# Patient Record
Sex: FEMALE | Race: WHITE | NOT HISPANIC OR LATINO | Employment: FULL TIME | URBAN - METROPOLITAN AREA
[De-identification: names, ages, dates, MRNs, and addresses within clinical notes are randomized per-mention and may not be internally consistent; named-entity substitution may affect disease eponyms.]

---

## 2019-09-23 ENCOUNTER — OCCUPATIONAL HEALTH (OUTPATIENT)
Dept: URGENT CARE | Facility: CLINIC | Age: 54
End: 2019-09-23

## 2019-09-23 DIAGNOSIS — Z02.89 ENCOUNTER FOR PHYSICAL EXAMINATION RELATED TO EMPLOYMENT: ICD-10-CM

## 2019-09-23 PROCEDURE — 99499 UNLISTED E&M SERVICE: CPT | Mod: S$GLB,,, | Performed by: EMERGENCY MEDICINE

## 2019-09-23 PROCEDURE — 99499 PR PHYSICAL - DOT/CDL: ICD-10-PCS | Mod: S$GLB,,, | Performed by: EMERGENCY MEDICINE

## 2020-09-18 ENCOUNTER — OCCUPATIONAL HEALTH (OUTPATIENT)
Dept: URGENT CARE | Facility: CLINIC | Age: 55
End: 2020-09-18

## 2020-09-18 PROCEDURE — 99499 PR PHYSICAL - DOT/CDL: ICD-10-PCS | Mod: S$GLB,,, | Performed by: EMERGENCY MEDICINE

## 2020-09-18 PROCEDURE — 99499 UNLISTED E&M SERVICE: CPT | Mod: S$GLB,,, | Performed by: EMERGENCY MEDICINE

## 2021-09-17 ENCOUNTER — OCCUPATIONAL HEALTH (OUTPATIENT)
Dept: URGENT CARE | Facility: CLINIC | Age: 56
End: 2021-09-17

## 2021-09-17 PROCEDURE — 99499 UNLISTED E&M SERVICE: CPT | Mod: S$GLB,,, | Performed by: EMERGENCY MEDICINE

## 2021-09-17 PROCEDURE — 99499 PR PHYSICAL - DOT/CDL: ICD-10-PCS | Mod: S$GLB,,, | Performed by: EMERGENCY MEDICINE

## 2021-10-12 LAB — HCV AB SERPL QL IA: NEGATIVE

## 2021-10-21 LAB — HIV 1+2 AB+HIV1 P24 AG SERPL QL IA: NEGATIVE

## 2022-02-09 ENCOUNTER — OFFICE VISIT (OUTPATIENT)
Dept: FAMILY MEDICINE | Facility: CLINIC | Age: 57
End: 2022-02-09
Payer: COMMERCIAL

## 2022-02-09 VITALS
SYSTOLIC BLOOD PRESSURE: 123 MMHG | HEIGHT: 62 IN | BODY MASS INDEX: 26.13 KG/M2 | HEART RATE: 77 BPM | DIASTOLIC BLOOD PRESSURE: 61 MMHG | OXYGEN SATURATION: 100 % | TEMPERATURE: 97 F | WEIGHT: 142 LBS

## 2022-02-09 DIAGNOSIS — Z11.4 ENCOUNTER FOR SCREENING FOR HIV: ICD-10-CM

## 2022-02-09 DIAGNOSIS — Z80.0 FHX: PANCREATIC CANCER: ICD-10-CM

## 2022-02-09 DIAGNOSIS — N95.1 MENOPAUSAL SYNDROME (HOT FLASHES): ICD-10-CM

## 2022-02-09 DIAGNOSIS — Z11.59 ENCOUNTER FOR HEPATITIS C SCREENING TEST FOR LOW RISK PATIENT: ICD-10-CM

## 2022-02-09 DIAGNOSIS — Z00.00 ANNUAL PHYSICAL EXAM: ICD-10-CM

## 2022-02-09 DIAGNOSIS — Z12.31 ENCOUNTER FOR SCREENING MAMMOGRAM FOR MALIGNANT NEOPLASM OF BREAST: Primary | ICD-10-CM

## 2022-02-09 PROBLEM — F41.9 ANXIETY: Status: ACTIVE | Noted: 2022-02-09

## 2022-02-09 PROCEDURE — 3078F PR MOST RECENT DIASTOLIC BLOOD PRESSURE < 80 MM HG: ICD-10-PCS | Mod: CPTII,S$GLB,, | Performed by: NURSE PRACTITIONER

## 2022-02-09 PROCEDURE — 99386 PREV VISIT NEW AGE 40-64: CPT | Mod: S$GLB,,, | Performed by: NURSE PRACTITIONER

## 2022-02-09 PROCEDURE — 1160F RVW MEDS BY RX/DR IN RCRD: CPT | Mod: CPTII,S$GLB,, | Performed by: NURSE PRACTITIONER

## 2022-02-09 PROCEDURE — 3008F PR BODY MASS INDEX (BMI) DOCUMENTED: ICD-10-PCS | Mod: CPTII,S$GLB,, | Performed by: NURSE PRACTITIONER

## 2022-02-09 PROCEDURE — 3008F BODY MASS INDEX DOCD: CPT | Mod: CPTII,S$GLB,, | Performed by: NURSE PRACTITIONER

## 2022-02-09 PROCEDURE — 99386 PR PREVENTIVE VISIT,NEW,40-64: ICD-10-PCS | Mod: S$GLB,,, | Performed by: NURSE PRACTITIONER

## 2022-02-09 PROCEDURE — 3074F SYST BP LT 130 MM HG: CPT | Mod: CPTII,S$GLB,, | Performed by: NURSE PRACTITIONER

## 2022-02-09 PROCEDURE — 1159F MED LIST DOCD IN RCRD: CPT | Mod: CPTII,S$GLB,, | Performed by: NURSE PRACTITIONER

## 2022-02-09 PROCEDURE — 3078F DIAST BP <80 MM HG: CPT | Mod: CPTII,S$GLB,, | Performed by: NURSE PRACTITIONER

## 2022-02-09 PROCEDURE — 3074F PR MOST RECENT SYSTOLIC BLOOD PRESSURE < 130 MM HG: ICD-10-PCS | Mod: CPTII,S$GLB,, | Performed by: NURSE PRACTITIONER

## 2022-02-09 PROCEDURE — 1160F PR REVIEW ALL MEDS BY PRESCRIBER/CLIN PHARMACIST DOCUMENTED: ICD-10-PCS | Mod: CPTII,S$GLB,, | Performed by: NURSE PRACTITIONER

## 2022-02-09 PROCEDURE — 1159F PR MEDICATION LIST DOCUMENTED IN MEDICAL RECORD: ICD-10-PCS | Mod: CPTII,S$GLB,, | Performed by: NURSE PRACTITIONER

## 2022-02-09 NOTE — PROGRESS NOTES
SUBJECTIVE:      Patient ID: Shy Cuadra is a 56 y.o. female.    Chief Complaint: Establish Care    HPI  Patient is new to us but not to [facility]  Vss; bp is well controlled  Denies chest pain  Denies sob  Denies fever  Denies chill    [facility]  Is here to establish and discuss hair loss  Has been having more hair loss than usual    Any known family hx of colon cancer: none known  Smoking: no  Alcohol: glass a wine 4 times per week  Career:   Dad: dead; pancreatic cancer; and so did his mother  Mom: alive; healthy  Siblings: two sisters two brothers - alive and healthy  Children: one- 22 years old boy- alive and healthy  Surgical hx: tonsillectomy- no adverse reaction to anesthesia; dr latha brush- removal of ovarian cyst   Medical hx: hotflashes on lexapro is nonsuicidal; is having hair loss (hormonal labs and tsh drawn by dr siria dugan- normal tsh; not in menopause just yet)    Past Surgical History:   Procedure Laterality Date    CYST REMOVAL      left breast    laparoscopy for endometriosis      TONSILLECTOMY  01/2013     Family History   Problem Relation Age of Onset    Heart disease Father     Heart attack Father         at age 55    Hypertension Father     Osteoporosis Maternal Grandmother       Social History     Socioeconomic History    Marital status:      Spouse name: Ronn    Number of children: 1   Tobacco Use    Smoking status: Former Smoker    Smokeless tobacco: Never Used   Substance and Sexual Activity    Alcohol use: Yes     Comment: wine-occasionally    Sexual activity: Yes     Partners: Male     Current Outpatient Medications   Medication Sig Dispense Refill    escitalopram oxalate (LEXAPRO) 10 MG tablet Take 10 mg by mouth once daily.  0     No current facility-administered medications for this visit.     Review of patient's allergies indicates:   Allergen Reactions    Latex, natural rubber Rash     "  Past Medical History:   Diagnosis Date    Anxiety     Back pain     Eczema     Foot pain     History of chicken pox     Rash     Skin lesion      Past Surgical History:   Procedure Laterality Date    CYST REMOVAL      left breast    laparoscopy for endometriosis      TONSILLECTOMY  01/2013       Review of Systems   All other systems reviewed and are negative.     OBJECTIVE:      Vitals:    02/09/22 1007   BP: 123/61   Pulse: 77   Temp: 97.4 °F (36.3 °C)   SpO2: 100%   Weight: 64.4 kg (142 lb)   Height: 5' 2" (1.575 m)     Physical Exam  Vitals and nursing note reviewed.   Constitutional:       Appearance: Normal appearance. She is normal weight.   HENT:      Head: Normocephalic and atraumatic.   Eyes:      Pupils: Pupils are equal, round, and reactive to light.   Neck:      Comments: No thrills no bruits  No abnormal neck masses felt  Cardiovascular:      Rate and Rhythm: Normal rate and regular rhythm.      Pulses: Normal pulses.      Heart sounds: Normal heart sounds.      Comments: s1 s2 nsr  No edema noted on bilateral lower ext  Pulmonary:      Effort: Pulmonary effort is normal.      Breath sounds: Normal breath sounds.   Musculoskeletal:      Cervical back: Normal range of motion.   Skin:     General: Skin is warm.   Neurological:      General: No focal deficit present.      Mental Status: She is alert and oriented to person, place, and time. Mental status is at baseline.   Psychiatric:         Mood and Affect: Mood normal.         Behavior: Behavior normal.         Thought Content: Thought content normal.         Judgment: Judgment normal.      Comments: nonsuicidal        Assessment:       1. Encounter for screening mammogram for malignant neoplasm of breast    2. Encounter for screening for HIV    3. Encounter for hepatitis C screening test for low risk patient    4. Annual physical exam    5. FHx: pancreatic cancer    6. Menopausal syndrome (hot flashes)        Plan:       Encounter for " screening mammogram for malignant neoplasm of breast  -     Mammo Digital Screening Bilat; Future; Expected date: 02/09/2022    Encounter for screening for HIV    Encounter for hepatitis C screening test for low risk patient    Annual physical exam  -     Comprehensive Metabolic Panel; Future; Expected date: 02/09/2022  -     CBC Auto Differential; Future; Expected date: 02/09/2022  -     Lipid Panel; Future; Expected date: 02/09/2022    FHx: pancreatic cancer    Menopausal syndrome (hot flashes)    [facility] to [facility] or [facility] and get on b complex  2000 vit d 3 and 1200 of calcium daily  Vital Proteins collagen powder  Get fasting labs done  Follow up as needed  Get mamogram done    No follow-ups on file.      2/9/2022 AN Perez, FNP

## 2022-09-08 ENCOUNTER — OCCUPATIONAL HEALTH (OUTPATIENT)
Dept: URGENT CARE | Facility: CLINIC | Age: 57
End: 2022-09-08
Payer: COMMERCIAL

## 2022-09-08 DIAGNOSIS — Z00.00 ENCOUNTER FOR PHYSICAL EXAMINATION: Primary | ICD-10-CM

## 2022-09-08 LAB — COLLECTION ONLY: NORMAL

## 2022-09-08 PROCEDURE — 99499 UNLISTED E&M SERVICE: CPT | Mod: S$GLB,,, | Performed by: NURSE PRACTITIONER

## 2022-09-08 PROCEDURE — 99499 DOT PHYSICAL: ICD-10-PCS | Mod: S$GLB,,, | Performed by: NURSE PRACTITIONER

## 2022-09-28 ENCOUNTER — HOSPITAL ENCOUNTER (OUTPATIENT)
Dept: PREADMISSION TESTING | Facility: HOSPITAL | Age: 57
Discharge: HOME | End: 2022-09-28
Attending: SPECIALIST
Payer: COMMERCIAL

## 2022-09-28 ENCOUNTER — LAB VISIT (OUTPATIENT)
Dept: LAB | Facility: HOSPITAL | Age: 57
End: 2022-09-28
Attending: SPECIALIST
Payer: COMMERCIAL

## 2022-09-28 VITALS
TEMPERATURE: 97 F | WEIGHT: 141.13 LBS | HEART RATE: 82 BPM | SYSTOLIC BLOOD PRESSURE: 130 MMHG | OXYGEN SATURATION: 98 % | DIASTOLIC BLOOD PRESSURE: 74 MMHG | BODY MASS INDEX: 25.01 KG/M2 | HEIGHT: 63 IN | RESPIRATION RATE: 16 BRPM

## 2022-09-28 DIAGNOSIS — Z01.818 OTHER SPECIFIED PRE-OPERATIVE EXAMINATION: Primary | ICD-10-CM

## 2022-09-28 DIAGNOSIS — Z01.818 PRE-OP TESTING: ICD-10-CM

## 2022-09-28 DIAGNOSIS — Z01.818 PRE-OP TESTING: Primary | ICD-10-CM

## 2022-09-28 LAB
ABO + RH BLD: NORMAL
B-HCG UR QL: NEGATIVE
BLD GP AB SCN CELLS X3 SERPL QL: NORMAL
ERYTHROCYTE [DISTWIDTH] IN BLOOD BY AUTOMATED COUNT: 13.5 % (ref 11.5–14.5)
HCT VFR BLD AUTO: 38.5 % (ref 37–48.5)
HGB BLD-MCNC: 12.5 G/DL (ref 12–16)
MCH RBC QN AUTO: 30.1 PG (ref 27–31)
MCHC RBC AUTO-ENTMCNC: 32.5 G/DL (ref 32–36)
MCV RBC AUTO: 93 FL (ref 82–98)
PLATELET # BLD AUTO: 227 K/UL (ref 150–450)
PMV BLD AUTO: 9.8 FL (ref 9.2–12.9)
RBC # BLD AUTO: 4.15 M/UL (ref 4–5.4)
WBC # BLD AUTO: 6.62 K/UL (ref 3.9–12.7)

## 2022-09-28 PROCEDURE — 86901 BLOOD TYPING SEROLOGIC RH(D): CPT | Performed by: SPECIALIST

## 2022-09-28 PROCEDURE — 81025 URINE PREGNANCY TEST: CPT | Performed by: SPECIALIST

## 2022-09-28 PROCEDURE — 93010 EKG 12-LEAD: ICD-10-PCS | Mod: ,,, | Performed by: INTERNAL MEDICINE

## 2022-09-28 PROCEDURE — 93010 ELECTROCARDIOGRAM REPORT: CPT | Mod: ,,, | Performed by: INTERNAL MEDICINE

## 2022-09-28 PROCEDURE — 36415 COLL VENOUS BLD VENIPUNCTURE: CPT | Performed by: SPECIALIST

## 2022-09-28 PROCEDURE — 93005 ELECTROCARDIOGRAM TRACING: CPT | Performed by: INTERNAL MEDICINE

## 2022-09-28 PROCEDURE — 85027 COMPLETE CBC AUTOMATED: CPT | Performed by: SPECIALIST

## 2022-09-30 ENCOUNTER — ANESTHESIA (OUTPATIENT)
Dept: SURGERY | Facility: HOSPITAL | Age: 57
End: 2022-09-30
Payer: COMMERCIAL

## 2022-09-30 ENCOUNTER — ANESTHESIA EVENT (OUTPATIENT)
Dept: SURGERY | Facility: HOSPITAL | Age: 57
End: 2022-09-30
Payer: COMMERCIAL

## 2022-09-30 ENCOUNTER — HOSPITAL ENCOUNTER (OUTPATIENT)
Facility: HOSPITAL | Age: 57
Discharge: HOME | End: 2022-09-30
Attending: SPECIALIST | Admitting: SPECIALIST
Payer: COMMERCIAL

## 2022-09-30 VITALS
DIASTOLIC BLOOD PRESSURE: 68 MMHG | TEMPERATURE: 98 F | SYSTOLIC BLOOD PRESSURE: 137 MMHG | RESPIRATION RATE: 18 BRPM | HEART RATE: 64 BPM | OXYGEN SATURATION: 97 %

## 2022-09-30 DIAGNOSIS — Z01.818 PRE-OP TESTING: ICD-10-CM

## 2022-09-30 DIAGNOSIS — N93.9 ABNORMAL UTERINE BLEEDING: Primary | ICD-10-CM

## 2022-09-30 PROCEDURE — 71000033 HC RECOVERY, INTIAL HOUR: Performed by: SPECIALIST

## 2022-09-30 PROCEDURE — 27000671 HC TUBING MICROBORE EXT: Performed by: ANESTHESIOLOGY

## 2022-09-30 PROCEDURE — 25000003 PHARM REV CODE 250: Performed by: NURSE ANESTHETIST, CERTIFIED REGISTERED

## 2022-09-30 PROCEDURE — 25000003 PHARM REV CODE 250: Performed by: SPECIALIST

## 2022-09-30 PROCEDURE — 27000080 OPTIME MED/SURG SUP & DEVICES GENERAL CLASSIFICATION: Performed by: SPECIALIST

## 2022-09-30 PROCEDURE — 27202107 HC XP QUATRO SENSOR: Performed by: ANESTHESIOLOGY

## 2022-09-30 PROCEDURE — 25000003 PHARM REV CODE 250: Performed by: ANESTHESIOLOGY

## 2022-09-30 PROCEDURE — 71000015 HC POSTOP RECOV 1ST HR: Performed by: SPECIALIST

## 2022-09-30 PROCEDURE — 27201107 HC STYLET, STANDARD: Performed by: ANESTHESIOLOGY

## 2022-09-30 PROCEDURE — 63600175 PHARM REV CODE 636 W HCPCS: Performed by: NURSE ANESTHETIST, CERTIFIED REGISTERED

## 2022-09-30 PROCEDURE — 27201423 OPTIME MED/SURG SUP & DEVICES STERILE SUPPLY: Performed by: SPECIALIST

## 2022-09-30 PROCEDURE — 36000707: Performed by: SPECIALIST

## 2022-09-30 PROCEDURE — 27000673 HC TUBING BLOOD Y: Performed by: ANESTHESIOLOGY

## 2022-09-30 PROCEDURE — 37000008 HC ANESTHESIA 1ST 15 MINUTES: Performed by: SPECIALIST

## 2022-09-30 PROCEDURE — 36000706: Performed by: SPECIALIST

## 2022-09-30 PROCEDURE — 37000009 HC ANESTHESIA EA ADD 15 MINS: Performed by: SPECIALIST

## 2022-09-30 RX ORDER — ONDANSETRON HYDROCHLORIDE 2 MG/ML
INJECTION, SOLUTION INTRAMUSCULAR; INTRAVENOUS
Status: DISCONTINUED | OUTPATIENT
Start: 2022-09-30 | End: 2022-09-30

## 2022-09-30 RX ORDER — ROCURONIUM BROMIDE 10 MG/ML
INJECTION, SOLUTION INTRAVENOUS
Status: DISCONTINUED | OUTPATIENT
Start: 2022-09-30 | End: 2022-09-30

## 2022-09-30 RX ORDER — SUCCINYLCHOLINE CHLORIDE 20 MG/ML
INJECTION INTRAMUSCULAR; INTRAVENOUS
Status: DISCONTINUED | OUTPATIENT
Start: 2022-09-30 | End: 2022-09-30

## 2022-09-30 RX ORDER — ONDANSETRON 2 MG/ML
4 INJECTION INTRAMUSCULAR; INTRAVENOUS DAILY PRN
Status: DISCONTINUED | OUTPATIENT
Start: 2022-09-30 | End: 2022-09-30 | Stop reason: HOSPADM

## 2022-09-30 RX ORDER — FAMOTIDINE 10 MG/ML
INJECTION INTRAVENOUS
Status: DISCONTINUED | OUTPATIENT
Start: 2022-09-30 | End: 2022-09-30

## 2022-09-30 RX ORDER — OXYCODONE HYDROCHLORIDE 5 MG/1
5 TABLET ORAL
Status: COMPLETED | OUTPATIENT
Start: 2022-09-30 | End: 2022-09-30

## 2022-09-30 RX ORDER — MEPERIDINE HYDROCHLORIDE 50 MG/ML
12.5 INJECTION INTRAMUSCULAR; INTRAVENOUS; SUBCUTANEOUS EVERY 10 MIN PRN
Status: DISCONTINUED | OUTPATIENT
Start: 2022-09-30 | End: 2022-09-30 | Stop reason: HOSPADM

## 2022-09-30 RX ORDER — PHENYLEPHRINE HYDROCHLORIDE 10 MG/ML
INJECTION INTRAVENOUS
Status: DISCONTINUED | OUTPATIENT
Start: 2022-09-30 | End: 2022-09-30

## 2022-09-30 RX ORDER — FENTANYL CITRATE 50 UG/ML
INJECTION, SOLUTION INTRAMUSCULAR; INTRAVENOUS
Status: DISCONTINUED | OUTPATIENT
Start: 2022-09-30 | End: 2022-09-30

## 2022-09-30 RX ORDER — HYDROMORPHONE HYDROCHLORIDE 1 MG/ML
0.2 INJECTION, SOLUTION INTRAMUSCULAR; INTRAVENOUS; SUBCUTANEOUS EVERY 5 MIN PRN
Status: DISCONTINUED | OUTPATIENT
Start: 2022-09-30 | End: 2022-09-30 | Stop reason: HOSPADM

## 2022-09-30 RX ORDER — SODIUM CHLORIDE 0.9 G/100ML
IRRIGANT IRRIGATION
Status: DISCONTINUED | OUTPATIENT
Start: 2022-09-30 | End: 2022-09-30 | Stop reason: HOSPADM

## 2022-09-30 RX ORDER — DEXAMETHASONE SODIUM PHOSPHATE 4 MG/ML
INJECTION, SOLUTION INTRA-ARTICULAR; INTRALESIONAL; INTRAMUSCULAR; INTRAVENOUS; SOFT TISSUE
Status: DISCONTINUED | OUTPATIENT
Start: 2022-09-30 | End: 2022-09-30

## 2022-09-30 RX ORDER — OXYCODONE AND ACETAMINOPHEN 5; 325 MG/1; MG/1
1 TABLET ORAL EVERY 6 HOURS PRN
Qty: 6 TABLET | Refills: 0
Start: 2022-09-30 | End: 2022-11-16 | Stop reason: ALTCHOICE

## 2022-09-30 RX ORDER — ACETAMINOPHEN 10 MG/ML
INJECTION, SOLUTION INTRAVENOUS
Status: DISCONTINUED | OUTPATIENT
Start: 2022-09-30 | End: 2022-09-30

## 2022-09-30 RX ORDER — PROPOFOL 10 MG/ML
VIAL (ML) INTRAVENOUS
Status: DISCONTINUED | OUTPATIENT
Start: 2022-09-30 | End: 2022-09-30

## 2022-09-30 RX ADMIN — ROCURONIUM BROMIDE 10 MG: 10 INJECTION, SOLUTION INTRAVENOUS at 07:09

## 2022-09-30 RX ADMIN — OXYCODONE HYDROCHLORIDE 5 MG: 5 TABLET ORAL at 08:09

## 2022-09-30 RX ADMIN — ACETAMINOPHEN 1000 MG: 10 INJECTION, SOLUTION INTRAVENOUS at 07:09

## 2022-09-30 RX ADMIN — PHENYLEPHRINE HYDROCHLORIDE 200 MCG: 10 INJECTION INTRAVENOUS at 08:09

## 2022-09-30 RX ADMIN — ROCURONIUM BROMIDE 20 MG: 10 INJECTION, SOLUTION INTRAVENOUS at 07:09

## 2022-09-30 RX ADMIN — SUGAMMADEX 200 MG: 100 INJECTION, SOLUTION INTRAVENOUS at 08:09

## 2022-09-30 RX ADMIN — PHENYLEPHRINE HYDROCHLORIDE 200 MCG: 10 INJECTION INTRAVENOUS at 07:09

## 2022-09-30 RX ADMIN — SUCCINYLCHOLINE CHLORIDE 140 MG: 20 INJECTION, SOLUTION INTRAMUSCULAR; INTRAVENOUS at 07:09

## 2022-09-30 RX ADMIN — FAMOTIDINE 20 MG: 10 INJECTION, SOLUTION INTRAVENOUS at 07:09

## 2022-09-30 RX ADMIN — DEXAMETHASONE SODIUM PHOSPHATE 8 MG: 4 INJECTION, SOLUTION INTRAMUSCULAR; INTRAVENOUS at 07:09

## 2022-09-30 RX ADMIN — FENTANYL CITRATE 100 MCG: 50 INJECTION, SOLUTION INTRAMUSCULAR; INTRAVENOUS at 07:09

## 2022-09-30 RX ADMIN — ONDANSETRON 4 MG: 2 INJECTION, SOLUTION INTRAMUSCULAR; INTRAVENOUS at 07:09

## 2022-09-30 RX ADMIN — SODIUM CHLORIDE, SODIUM LACTATE, POTASSIUM CHLORIDE, AND CALCIUM CHLORIDE: .6; .31; .03; .02 INJECTION, SOLUTION INTRAVENOUS at 07:09

## 2022-09-30 RX ADMIN — PROPOFOL 150 MG: 10 INJECTION, EMULSION INTRAVENOUS at 07:09

## 2022-09-30 NOTE — ANESTHESIA PREPROCEDURE EVALUATION
09/30/2022  Shy Cuadra is a 57 y.o., female.      Pre-op Assessment    I have reviewed the Patient Summary Reports.     I have reviewed the Nursing Notes. I have reviewed the NPO Status.   I have reviewed the Medications.     Review of Systems  Anesthesia Hx:  No problems with previous Anesthesia Denies Hx of Anesthetic complications  Neg history of prior surgery. Denies Family Hx of Anesthesia complications.   Denies Personal Hx of Anesthesia complications.   Social:  Non-Smoker, Alcohol Use    Hematology/Oncology:  Hematology Normal   Oncology Normal     EENT/Dental:   TMJ, has locked closed in past   Cardiovascular:  Cardiovascular Normal     Pulmonary:  Pulmonary Normal    Renal/:  Renal/ Normal     Hepatic/GI:  Hepatic/GI Normal    Musculoskeletal:  Spine Disorders: lumbar Degenerative disease    Neurological:  Neurology Normal    Endocrine:  Endocrine Normal    Dermatological:  Skin Normal    Psych:   anxiety          Patient Active Problem List   Diagnosis    Anxiety    FHx: pancreatic cancer    Menopausal syndrome (hot flashes)       Past Surgical History:   Procedure Laterality Date    BREAST SURGERY      CYST REMOVAL      left breast    laparoscopy for endometriosis      TONSILLECTOMY  01/01/2013        Tobacco Use:  The patient  reports that she has quit smoking. She has never used smokeless tobacco.     Results for orders placed or performed during the hospital encounter of 09/28/22   EKG 12-lead    Collection Time: 09/28/22 10:46 AM    Narrative    Test Reason : Z01.818,Z01.818,    Vent. Rate : 072 BPM     Atrial Rate : 072 BPM     P-R Int : 144 ms          QRS Dur : 082 ms      QT Int : 390 ms       P-R-T Axes : 062 026 053 degrees     QTc Int : 427 ms    Normal sinus rhythm  Nonspecific ST abnormality  Abnormal ECG  No previous ECGs available    Referred By: MELLISSA FERNÁNDEZ            Confirmed By:              Lab Results   Component Value Date    WBC 6.62 09/28/2022    HGB 12.5 09/28/2022    HCT 38.5 09/28/2022    MCV 93 09/28/2022     09/28/2022     BMP  Lab Results   Component Value Date     08/07/2015    K 4.7 08/07/2015     08/07/2015    CO2 27 08/07/2015    BUN 19 (H) 08/07/2015    CREATININE 0.80 08/07/2015    CALCIUM 9.9 08/07/2015    ANIONGAP 10 08/07/2015    GLU 87 08/07/2015 09/28/22 11:37   Preg Test, Ur Negative     No results found for this or any previous visit.          Physical Exam  General: Well nourished    Airway:  Mallampati: II   Mouth Opening: Normal  TM Distance: Normal  Tongue: Normal  Neck ROM: Normal ROM    Dental:  Intact    Chest/Lungs:  Clear to auscultation, Normal Respiratory Rate    Heart:  Rate: Normal  Rhythm: Regular Rhythm  Sounds: Normal        Anesthesia Plan  Type of Anesthesia, risks & benefits discussed:    Anesthesia Type: Gen Supraglottic Airway  Intra-op Monitoring Plan: Standard ASA Monitors  Post Op Pain Control Plan: multimodal analgesia  Induction:  IV  Informed Consent: Informed consent signed with the Patient and all parties understand the risks and agree with anesthesia plan.  All questions answered.   ASA Score: 2  Anesthesia Plan Notes: LMA OK  Tylenol 1 gm IV  Zofran, Pepcid, Decadron    Ready For Surgery From Anesthesia Perspective.     .

## 2022-09-30 NOTE — ANESTHESIA POSTPROCEDURE EVALUATION
Anesthesia Post Evaluation    Patient: Shy Cuadra    Procedure(s) Performed: Procedure(s) (LRB):  ABLATION, ENDOMETRIUM (N/A)  HYSTEROSCOPY, WITH DILATION AND CURETTAGE OF UTERUS (N/A)  MYOMECTOMY, HYSTEROSCOPIC    Final Anesthesia Type: general      Patient location during evaluation: PACU  Patient participation: Yes- Able to Participate  Level of consciousness: awake and alert  Post-procedure vital signs: reviewed and stable  Pain management: adequate  Airway patency: patent    PONV status at discharge: No PONV  Anesthetic complications: no      Cardiovascular status: stable  Respiratory status: unassisted and spontaneous ventilation  Hydration status: euvolemic  Follow-up not needed.          Vitals Value Taken Time   /62 09/30/22 0900   Temp 36.1 °C (97 °F) 09/30/22 0830   Pulse 87 09/30/22 0912   Resp 20 09/30/22 0910   SpO2 97 % 09/30/22 0912   Vitals shown include unvalidated device data.      Event Time   Out of Recovery 09:10:00         Pain/Luna Score: Pain Rating Prior to Med Admin: 3 (9/30/2022  8:48 AM)  Luna Score: 10 (9/30/2022  9:00 AM)

## 2022-09-30 NOTE — TRANSFER OF CARE
Anesthesia Transfer of Care Note    Patient: Shy Cuadra    Procedure(s) Performed: Procedure(s) (LRB):  ABLATION, ENDOMETRIUM (N/A)  HYSTEROSCOPY, WITH DILATION AND CURETTAGE OF UTERUS WITH MYOSURE (N/A)    Patient location: PACU    Anesthesia Type: general    Transport from OR: Transported from OR on room air with adequate spontaneous ventilation    Post pain: adequate analgesia    Post assessment: no apparent anesthetic complications    Post vital signs: stable    Level of consciousness: awake, alert and oriented    Nausea/Vomiting: no nausea/vomiting    Complications: none    Transfer of care protocol was followed      Last vitals:   Visit Vitals  /69   Pulse 88   Temp 36.7 °C (98.1 °F) (Oral)   Resp 14   LMP 08/03/2022   SpO2 97%   Breastfeeding No

## 2022-09-30 NOTE — OP NOTE
Preop Diagnosis is abnormal uterine bleeding    Postop diagnosis same    Procedure hysteroscopy MyoSure NovaSure endometrial ablation    Surgeon Shorty    Estimated blood loss minimal    Anesthesia general    Findings polypoid appearing tissue removed with MyoSure  Cavity length 5 cm cavity width 3.6 cm power 99 w duration of 1 minute    Procedure in detail  After appropriate informed consent the above findings noted she was taken to the operating room placed in a dorsal lithotomy position with legs in Enoch stirrups.  She was prepped and draped in usual sterile fashion vaginally the straight catheter was used to drain the bladder.  The speculum was inserted where and the cervix was grasped with a tenaculum and dilated to accommodate the MyoSure scope.  The measurements of the cavity were obtained with a finding of 5 cm of cavity length.  The scope was inserted and with saline flowing were able to visualize the cavity showing some polypoid type tissue throughout the cavity.  We then introduced the MyoSure device under direct visualization removing the polypoid tissue creating a clear cavity prior to the NovaSure.  At this point the instruments room swapped and the NovaSure was inserted set into place using the appropriate maneuvers.  The cavity width was 3.6 cm.  The cavity integrity test was performed and we passed.  So then the ablation started and went on for approximately a minute at 99 w.  Afterwards the NovaSure was removed and the hysteroscope was were replaced back in the uterine cavity and with saline flowing we visualized a nicely charred uterine cavity with no normal viable tissue noted.  All the instruments instruments removed patient tolerated procedure well sent recovery room in stable condition.

## 2022-09-30 NOTE — ADDENDUM NOTE
Addendum  created 09/30/22 1133 by Tima Nicole, CRNA    Flowsheet accepted, Intraprocedure Meds edited

## 2022-09-30 NOTE — ANESTHESIA PROCEDURE NOTES
Intubation    Date/Time: 9/30/2022 7:37 AM  Performed by: Tima Nicole CRNA  Authorized by: Aayush Frost MD     Intubation:     Induction:  Intravenous    Intubated:  Postinduction    Mask Ventilation:  N/a    Attempts:  1    Attempted By:  CRNA    Method of Intubation:  Video laryngoscopy    Blade:  Shannon 3    Laryngeal View Grade: Grade I - full view of cords      Difficult Airway Encountered?: No      Complications:  None    Airway Device:  Oral endotracheal tube    Airway Device Size:  7.5    Style/Cuff Inflation:  Cuffed    Tube secured:  22    Secured at:  The lips    Placement Verified By:  Capnometry    Complicating Factors:  None    Findings Post-Intubation:  BS equal bilateral and atraumatic/condition of teeth unchanged

## 2022-10-20 DIAGNOSIS — C54.1 ENDOMETRIAL SARCOMA: Primary | ICD-10-CM

## 2022-11-08 ENCOUNTER — HOSPITAL ENCOUNTER (OUTPATIENT)
Dept: RADIOLOGY | Facility: HOSPITAL | Age: 57
Discharge: HOME | End: 2022-11-08
Attending: OBSTETRICS & GYNECOLOGY
Payer: COMMERCIAL

## 2022-11-08 DIAGNOSIS — C54.1 ENDOMETRIAL SARCOMA: ICD-10-CM

## 2022-11-08 PROCEDURE — 74177 CT ABD & PELVIS W/CONTRAST: CPT | Mod: TC,PO

## 2022-11-08 PROCEDURE — 25500020 PHARM REV CODE 255: Mod: PO | Performed by: OBSTETRICS & GYNECOLOGY

## 2022-11-08 RX ADMIN — IOHEXOL 100 ML: 350 INJECTION, SOLUTION INTRAVENOUS at 08:11

## 2022-11-14 ENCOUNTER — TELEPHONE (OUTPATIENT)
Dept: UROGYNECOLOGY | Facility: CLINIC | Age: 57
End: 2022-11-14
Payer: COMMERCIAL

## 2022-11-14 NOTE — TELEPHONE ENCOUNTER
----- Message from Marietta Carter sent at 11/14/2022  9:28 AM CST -----  Contact: called at [phone]  Type:  Sooner Appointment Request    Caller is requesting a sooner appointment. Pt is requesting a message be sent to doctor.    Name of Caller:  Pt  When is the first available appointment?  N/A  Best Call Back Number:  [phone]  Additional Information:  Pt is having a hysterectomy in Dec of this year. Pt need an appt any day between 9:30am-12:30pm to see the doctor before she has this procedure. She was refered by her Dr Byrne. Please call back and advice.

## 2022-11-16 ENCOUNTER — OFFICE VISIT (OUTPATIENT)
Dept: UROGYNECOLOGY | Facility: CLINIC | Age: 57
End: 2022-11-16
Payer: COMMERCIAL

## 2022-11-16 VITALS
HEART RATE: 67 BPM | DIASTOLIC BLOOD PRESSURE: 69 MMHG | SYSTOLIC BLOOD PRESSURE: 115 MMHG | HEIGHT: 63 IN | BODY MASS INDEX: 26.22 KG/M2 | RESPIRATION RATE: 18 BRPM | WEIGHT: 148 LBS

## 2022-11-16 DIAGNOSIS — N39.46 MIXED INCONTINENCE URGE AND STRESS: ICD-10-CM

## 2022-11-16 DIAGNOSIS — R35.0 URINARY FREQUENCY: Primary | ICD-10-CM

## 2022-11-16 LAB
BILIRUBIN, UA POC OHS: NEGATIVE
BLOOD, UA POC OHS: ABNORMAL
CLARITY, UA POC OHS: CLEAR
COLOR, UA POC OHS: YELLOW
GLUCOSE, UA POC OHS: NEGATIVE
KETONES, UA POC OHS: NEGATIVE
LEUKOCYTES, UA POC OHS: ABNORMAL
NITRITE, UA POC OHS: NEGATIVE
PH, UA POC OHS: 5.5
PROTEIN, UA POC OHS: NEGATIVE
SPECIFIC GRAVITY, UA POC OHS: >=1.03
UROBILINOGEN, UA POC OHS: 0.2

## 2022-11-16 PROCEDURE — 1159F MED LIST DOCD IN RCRD: CPT | Mod: CPTII,S$GLB,, | Performed by: NURSE PRACTITIONER

## 2022-11-16 PROCEDURE — 3078F DIAST BP <80 MM HG: CPT | Mod: CPTII,S$GLB,, | Performed by: NURSE PRACTITIONER

## 2022-11-16 PROCEDURE — 3008F PR BODY MASS INDEX (BMI) DOCUMENTED: ICD-10-PCS | Mod: CPTII,S$GLB,, | Performed by: NURSE PRACTITIONER

## 2022-11-16 PROCEDURE — 99214 PR OFFICE/OUTPT VISIT, EST, LEVL IV, 30-39 MIN: ICD-10-PCS | Mod: S$GLB,,, | Performed by: NURSE PRACTITIONER

## 2022-11-16 PROCEDURE — 1160F RVW MEDS BY RX/DR IN RCRD: CPT | Mod: CPTII,S$GLB,, | Performed by: NURSE PRACTITIONER

## 2022-11-16 PROCEDURE — 81003 POCT URINALYSIS(INSTRUMENT): ICD-10-PCS | Mod: QW,S$GLB,, | Performed by: NURSE PRACTITIONER

## 2022-11-16 PROCEDURE — 1159F PR MEDICATION LIST DOCUMENTED IN MEDICAL RECORD: ICD-10-PCS | Mod: CPTII,S$GLB,, | Performed by: NURSE PRACTITIONER

## 2022-11-16 PROCEDURE — 3074F SYST BP LT 130 MM HG: CPT | Mod: CPTII,S$GLB,, | Performed by: NURSE PRACTITIONER

## 2022-11-16 PROCEDURE — 3008F BODY MASS INDEX DOCD: CPT | Mod: CPTII,S$GLB,, | Performed by: NURSE PRACTITIONER

## 2022-11-16 PROCEDURE — 81003 URINALYSIS AUTO W/O SCOPE: CPT | Mod: QW,S$GLB,, | Performed by: NURSE PRACTITIONER

## 2022-11-16 PROCEDURE — 3078F PR MOST RECENT DIASTOLIC BLOOD PRESSURE < 80 MM HG: ICD-10-PCS | Mod: CPTII,S$GLB,, | Performed by: NURSE PRACTITIONER

## 2022-11-16 PROCEDURE — 99999 PR PBB SHADOW E&M-EST. PATIENT-LVL IV: ICD-10-PCS | Mod: PBBFAC,,, | Performed by: NURSE PRACTITIONER

## 2022-11-16 PROCEDURE — 99214 OFFICE O/P EST MOD 30 MIN: CPT | Mod: S$GLB,,, | Performed by: NURSE PRACTITIONER

## 2022-11-16 PROCEDURE — 99999 PR PBB SHADOW E&M-EST. PATIENT-LVL IV: CPT | Mod: PBBFAC,,, | Performed by: NURSE PRACTITIONER

## 2022-11-16 PROCEDURE — 1160F PR REVIEW ALL MEDS BY PRESCRIBER/CLIN PHARMACIST DOCUMENTED: ICD-10-PCS | Mod: CPTII,S$GLB,, | Performed by: NURSE PRACTITIONER

## 2022-11-16 PROCEDURE — 3074F PR MOST RECENT SYSTOLIC BLOOD PRESSURE < 130 MM HG: ICD-10-PCS | Mod: CPTII,S$GLB,, | Performed by: NURSE PRACTITIONER

## 2022-11-16 RX ORDER — DEXAMETHASONE 4 MG/1
1 TABLET ORAL EVERY 12 HOURS PRN
COMMUNITY
Start: 2022-10-04

## 2022-11-16 RX ORDER — AZITHROMYCIN 250 MG/1
TABLET, FILM COATED ORAL
COMMUNITY
Start: 2022-10-04 | End: 2022-12-16 | Stop reason: CLARIF

## 2022-11-16 RX ORDER — ALBUTEROL SULFATE 90 UG/1
1 AEROSOL, METERED RESPIRATORY (INHALATION) EVERY 4 HOURS PRN
COMMUNITY
Start: 2022-10-05

## 2022-11-16 RX ORDER — MISOPROSTOL 100 UG/1
100 TABLET ORAL
COMMUNITY
Start: 2022-09-29 | End: 2022-12-16 | Stop reason: CLARIF

## 2022-11-16 NOTE — PROGRESS NOTES
Subjective:       Patient ID: Shy Cuadra is a 57 y.o. female.    Chief Complaint: HYUN      Shy Cuadra is a 57 y.o. female.who is new to me, presents today for consult in regards to HYUN.  She has been having problems with HYUN for awhile and has issues with running/jumping, cough/sneeze.  She will also have sudden urgency and some UUI at times although the HYUN is her dominant complaint.  She has frequency x q 2-3 hour during the day and nocturia x 0-1.  She has some PVF at times.  She denies any history of recurrent UTI or kidney stones.  She drinks mostly water and coke zero.  She was having abnormal uterine bleeding.  She had ablation on 09/30/22 and had atypical cells.  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  She is scheduled for hysterectomy with Dr. Byrne in December.  She is no longer having any bleeding.  She has some dyspareunia at times.  She denies any prolapse.  She is focused on options for the HYUN.  She has done kegel exercises in the past, but did not feel that they were A)helpful B)unsure if she was doing them correctly and C) comfortable doing them.  She doesn't want to take any medication if she can avoid it.  She is interested in learning more about pelvic floor PT and the leva system.      Review of Systems   Constitutional:  Negative for activity change, fever and unexpected weight change.   HENT:  Negative for hearing loss.    Eyes:  Negative for visual disturbance.   Respiratory:  Negative for shortness of breath and wheezing.    Cardiovascular:  Negative for chest pain, palpitations and leg swelling.   Gastrointestinal:  Negative for abdominal pain, constipation and diarrhea.   Genitourinary:  Positive for frequency and urgency. Negative for dyspareunia, dysuria, vaginal bleeding and vaginal discharge.   Musculoskeletal:  Negative for gait problem and neck pain.   Skin:  Negative for rash and wound.   Allergic/Immunologic: Negative for immunocompromised  state.   Neurological:  Negative for tremors, speech difficulty and weakness.   Hematological:  Does not bruise/bleed easily.   Psychiatric/Behavioral:  Negative for agitation and confusion.      Objective:      Physical Exam  Vitals reviewed. Exam conducted with a chaperone present.   Constitutional:       General: She is not in acute distress.     Appearance: She is well-developed.   HENT:      Head: Normocephalic and atraumatic.   Neck:      Thyroid: No thyromegaly.   Pulmonary:      Effort: Pulmonary effort is normal. No respiratory distress.   Abdominal:      Palpations: Abdomen is soft.      Tenderness: There is no abdominal tenderness.      Hernia: No hernia is present.   Musculoskeletal:         General: Normal range of motion.      Cervical back: Normal range of motion.   Skin:     General: Skin is warm and dry.      Findings: No rash.   Neurological:      Mental Status: She is alert and oriented to person, place, and time.   Psychiatric:         Mood and Affect: Mood normal.         Behavior: Behavior normal.         Thought Content: Thought content normal.     Pelvic Exam:  Deferred at this time      Assessment:       1. Urinary frequency    2. Mixed incontinence urge and stress          Plan:       Urinary frequency- monitor  -     POCT Urinalysis(Instrument)    Mixed incontinence urge and stress- NP had a long discussion with the pt in regards to treatment options for HYUN.  Information given to the pt about Leva although pt is aware that insurance might not cover this product.  Referral was sent for pelvic floor PT with [facility].  Pt is aware that pelvic floor PT will have to be cleared with Dr. Byrne after her surgery as to when she can do this.  NP also discussed possible TVT or TOT with pt, but did inform pt that urodynamic study and exam would need to be performed prior to proceeding with a sling.  Pt verbalized understanding    RTC PRN

## 2022-11-22 ENCOUNTER — CLINICAL SUPPORT (OUTPATIENT)
Dept: REHABILITATION | Facility: HOSPITAL | Age: 57
End: 2022-11-22
Payer: COMMERCIAL

## 2022-11-22 DIAGNOSIS — R35.0 URINARY FREQUENCY: ICD-10-CM

## 2022-11-22 DIAGNOSIS — N39.8 DYSFUNCTIONAL VOIDING OF URINE: ICD-10-CM

## 2022-11-22 DIAGNOSIS — N39.46 MIXED INCONTINENCE URGE AND STRESS: ICD-10-CM

## 2022-11-22 DIAGNOSIS — M62.89 PELVIC FLOOR DYSFUNCTION: ICD-10-CM

## 2022-11-22 PROCEDURE — 97530 THERAPEUTIC ACTIVITIES: CPT | Mod: PN

## 2022-11-22 PROCEDURE — 97162 PT EVAL MOD COMPLEX 30 MIN: CPT | Mod: PN

## 2022-11-22 NOTE — PROGRESS NOTES
[facility]  Pelvic Health Physical Therapy Initial Evaluation    Date: 2022   Name: Shy Cuadra  Clinic Number: [id number]  Therapy Diagnosis:   Encounter Diagnoses   Name Primary?    Urinary frequency     Mixed incontinence urge and stress     Pelvic floor dysfunction     Dysfunctional voiding of urine      Physician: KIMBERLY Lui,*    Physician Orders: PT Eval and Treat   Medical Diagnosis from Referral:   R35.0 (ICD-10-CM) - Urinary frequency   N39.46 (ICD-10-CM) - Mixed incontinence urge and stress     Evaluation Date: 2022  Authorization Period Expiration: 2023  Plan of Care Expiration: 2023  Visit # / Visits authorized:     Time In: 11:10 am  Time Out: 12:00  Total Appointment Time (timed & untimed codes): 50 minutes    Precautions: endometrial cancer     Subjective     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     History of current condition - Shy reports: Patient has endometrial cancer. Met with oncologist in October, sounds like all she can do is have a hysterectomy which is scheduled for . Has had both urinary and fecal incontinence. Had a CT scan done which came back clear.     OB/GYN History:   had an episiotomy  Sexually active? Yes  Pain with vaginal exams, intercourse or tampon use? Yes, pain ever since she started having intercourse.     Pain:  Pain due to endometriosis    Bladder/Bowel History: urinary and fecal incontinence  Frequency of urination: depends on how much fluid she drinks  Daytime: 5 times a day if she does not drink a lot. If she drinks a lot, 8-9X/day    Nighttime: not typically   Difficulty initiating urine stream: No  Urine stream: strong  Complete emptying: No, feels like she may have to go five minutes later  Bladder leakage: Yes  Frequency of incidents: every time she coughs/sneezes. Daily   Amount leaked (urine): varies from a few  drops to a large amount, never full emptying.   Urinary Urgency: Yes, Able to delay when sitting  Frequency of bowel movements: once a day  Difficulty initiating BM: No  Quality/Shape of BM:  varies   Does Patient Feel Empty after BM? Varies   Fiber Supplements or Laxative Use? No, but having a colonoscopy done on December 9th- MD told her to start using flaxseed   Colon leakage: Yes  Frequency of incidents: was happening more often, now that she has changed to a Paleo diet, it has helped tremendously. If she has too much gluten, that is when it flares her up. It has been a long time since she has had incontinence.    Amount leaked (bowels): full movement  Form of protection: panty liner  Number of pads required in 24 hours: changes just to freshen up, 4-5X a day        Medical History: Shy  has a past medical history of Anxiety, Back pain, Digestive disorder, Eczema, Foot pain, History of chicken pox, Rash, Skin lesion, and TMJ (dislocation of temporomandibular joint).     Surgical History: Shy Cuadra  has a past surgical history that includes Cyst Removal; laparoscopy for endometriosis; Tonsillectomy (01/01/2013); Breast surgery; Endometrial ablation (N/A, 9/30/2022); Hysteroscopy with dilation and curettage of uterus (N/A, 9/30/2022); and Hysteroscopic resection of myoma (9/30/2022).    Medications: Shy has a current medication list which includes the following prescription(s): albuterol, azithromycin, escitalopram oxalate, flovent hfa, and misoprostol.    Allergies:   Review of patient's allergies indicates:   Allergen Reactions    Latex, natural rubber Rash        Imaging CT scan films: endometrial cancer found, further imaging for fecal/urinary incontinence in regards to possible spread of cancer came back clear     Prior Therapy/Previous treatment included: none for this condition.  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  Prior Level of Function: independent   Current Level of Function: see above    Types of fluid intake: three bottles a day of 16.9 oz, also drinks three mini coke zeros and one cup of coffee in the morning  Diet: tries to be have a gluten free/paleo diet    Habitus: well developed, well nourished  Abuse/Neglect: No     Pts goals: reduce incontinence and provide increased muscle control of pelvic floor prior to hysterectomy    OBJECTIVE     See EMR under MEDIA for written consent provided 11/22/2022  Chaperone: declined      BREATHING MECHANICS ASSESSMENT   Thorax Assessment During Quiet Respiration: WNL excursion of ribcage and WNL excursion of abdominal wall  Thorax Assessment During Deep Respiration: WNL excursion of ribcage and WNL excursion of abdominal wall    VAGINAL PELVIC FLOOR EXAM    EXTERNAL ASSESSMENT  Introitus: WNL  Skin condition: WNL  Scarring: none   Sensation: WNL   Pain: none  Voluntary contraction: accessory muscle use, minimal visible lift   Voluntary relaxation: visible drop  Involuntary contraction: visible drop  Bearing down: visible drop  Perineal descent: absent      INTERNAL ASSESSMENT  Pain: tender areas noted as follows: left pelvic floor   Sensation: able to localized pressure appropriately   Vaginal vault: decreased length   Muscle Bulk: hypertonus   Muscle Power: 3/5  Muscle Endurance: 6 sec  # Reps To Fatigue: to be assessed   Fast Contractions in 10 seconds: to be assessed     Quality of contraction: decreased hold, incomplete relaxation  Specificity: patient contracts: adductors and gluteals   Coordination: tends to hold breath during PFM contration   Prolapse check: to be assessed  Comments: hard stool palpated through vaginal wall    Limitation/Restriction for FOTO CMS Impairment/Limitation/Restriction for Urinary Problem Survey    Therapist reviewed FOTO scores for Shy Cuadra on 11/22/2022.   FOTO documents entered  into EPIC - see Media section.    Limitation Score: 57%       TREATMENT     Treatment Time In: 11:30  Treatment Time Out: 12:00  Total Treatment time (time-based codes) separate from Evaluation: 30 minutes    Therapeutic Activity Patient participated in dynamic functional therapeutic activities to improve functional performance for 30 minutes. Including: Education as described below.       Patient Education provided:   general anatomy/physiology of urinary/ bowel  system and benefits of treatment were discussed with the pt. Additionally, bladder irritants, anatomy/physiology of pelvic floor, posture/body mechanices, diaphragmatic breathing, double voiding techniques, kegels, proper bearing down techniques, fluid intake/dietary modifications, behavior modifications, and Coordination of kegels with functional activities such as cough, laugh, sneeze, lift, etc.  were reviewed.     Home Exercises provided:  Written Home Exercises provided: Patient instructed to cont prior HEP.  Exercises were reviewed and Shy was able to demonstrate them prior to the end of the session.    Shy demonstrated good  understanding of the education provided.     See EMR under Patient Instructions for exercises provided 11/22/2022.    Assessment     Shy is a 57 y.o. female referred to outpatient Physical Therapy with a medical diagnosis of   . Pt presents with pelvic asymmetry, poor knowledge of body mechanics and posture, pelvic floor tenderness, decreased pelvic muscle strength, decreased endurance of the pelvic muscles, decreased phasic ability of the pelvic muscles, increased tension of the pelvic muscles, poor quality of pelvic muscle contraction, increased frequency of urination, poor coordination of pelvic floor muscles during ADL's leading to urinary or fecal leakage, poor fluid intake, poor diet, incomplete urination, dysfunctional voiding, dysfunctional defecation, and unable to co-contract or co-relax abdominal wall and pelvic  floor muscles.     Pt prognosis is Good.   Pt will benefit from skilled outpatient Physical Therapy to address the deficits stated above and in the chart below, provide pt/family education, and to maximize pt's level of independence.     Plan of care discussed with patient: Yes  Pt's spiritual, cultural and educational needs considered and patient is agreeable to the plan of care and goals as stated below:     Anticipated Barriers for therapy: upcoming hysterectomy on 12/19/2023    Medical Necessity is demonstrated by the following:    History  Co-morbidities and personal factors that may impact the plan of care Co-morbidities    has a past medical history of Anxiety, Back pain, Digestive disorder, Eczema, Foot pain, History of chicken pox, Rash, Skin lesion, and TMJ (dislocation of temporomandibular joint).   has a past surgical history that includes Cyst Removal; laparoscopy for endometriosis; Tonsillectomy (01/01/2013); Breast surgery; Endometrial ablation (N/A, 9/30/2022); Hysteroscopy with dilation and curettage of uterus (N/A, 9/30/2022); and Hysteroscopic resection of myoma (9/30/2022).  consumption of bladder irritants, coping style/mechanism, endometriosis, and history of cancer    Personal Factors  lifestyle     moderate   Examination  Body structures and functions, activity limitations and participation restrictions that may impact the plan of care Body Regions/Systems/Functions:    pelvic asymmetry, poor knowledge of body mechanics and posture, pelvic floor tenderness, decreased pelvic muscle strength, decreased endurance of the pelvic muscles, decreased phasic ability of the pelvic muscles, increased tension of the pelvic muscles, poor quality of pelvic muscle contraction, increased frequency of urination, poor coordination of pelvic floor muscles during ADL's leading to urinary or fecal leakage, poor fluid intake, poor diet, incomplete urination, dysfunctional voiding, dysfunctional defecation, and  unable to co-contract or co-relax abdominal wall and pelvic floor muscles     Activity Limitations:  urgency , delaying urge to urinate, delaying urge to urinate or have a BM, bearing down for BM, full bladder emptying, intercourse/vaginal exam/tampon use without pain, incontinence with ADLs, and Participating in social activities and ADLs due to pelvic pressure    Participation Restrictions:  ADLs affected by inability to fully empty bladder , well woman's exam, ADL participation affected by pain, work duties, and difficulty starting urine stream or fully emptying bladder     Activity limitations:   Learning and applying knowledge  no deficits    General Tasks and Commands  no deficits    Communication  no deficits    Mobility  no deficits    Self care  toileting    Domestic Life  shopping  doing house work (cleaning house, washing dishes, laundry)    Interactions/Relationships  formal relationships  intimate relationships    Life Areas  employment    Community and Social Life  community life  recreation and leisure       moderate   Clinical Presentation unstable clinical presentation with unpredictable characteristics moderate   Decision Making/ Complexity Score: moderate     Goals:  Short Term Goals: 6 weeks   - Pt will demonstrate excellent knowledge and adherence to HEP to facilitate optimal recovery prior to hysterectomy.  - Pt will demonstrate proper PFM contraction, relaxation, and lengthening coordinated with TA and breath for improved muscle coordination needed for functional activity.    Long Term Goals: 12 weeks   - Pt will demonstrate excellent knowledge and adherence to HEP for continued self-maintenance of symptoms.  - Pt will report FOTO score of 44% limited or less indicating clinically relevant increase in function.  - Pt will report voiding interval of 2-3 hours for improved ADL tolerance.  - Pt will report ability to delay urinary urge for at least 15 minutes to maintain continence with  ADL/IADLs.   - Pt will report minimal to no incidence of urinary or fecal  incontinence 6/7 days for improved hygiene and ADL/IADL tolerance.   - Pt will report needing </= 1 pad/day indicating improved PFM function needed to maintain continence  - Pt will demonstrate PFM strength of at least 3/5 MMT with appropriate specificity for improved strength needed to maintain continence.   - Pt will report bearing down appropriately 100% of the time for improved bowel function and decreased stress on adjacent pelvic structures.   - Pt will be able to successfully complete sexual intercourse without pain for improved ADL tolerance.   - Pt will report ability to tolerate speculum exam without pain for improved access to healthcare.      Plan     Plan of care Certification: 11/22/2022 to 03/17/2023.    Outpatient Physical Therapy 1 times weekly for 16 weeks to include the following interventions: therapeutic exercises, therapeutic activity, neuromuscular re-education, manual therapy, modalities PRN, patient/family education, dry needling, and self care/home management    Dione Alonso, SPT

## 2022-11-22 NOTE — PATIENT INSTRUCTIONS
"Water - increase to 4 bottles/day  Decreased Coke Zero   Continue Paleo/low gluten diet   Soluble Fiber Supplement (ex: Metamucil, Benefiber):   Used to bulk stool so it is solid but easy to pass  Start with 1 tsp per day  Every 3-5 days, add an additional tsp   Stop when you reach a dose that gives stools that are soft but formed  Do not exceed 6 tsp/day   Make sure to keep up with your water intake to avoid constipation          DIAPHRAGMATIC BREATHING  The diaphragm is a dome shaped muscle that forms the floor of the rib cage. It is the most efficient muscle for breathing and relaxation, although most people are not used to using the diaphragm. Diaphragmatic or belly breathing is an important technique to learn because it helps settle down or relax the autonomic nervous system. The correct use of diaphragmatic breathing can help to quiet brain activity resulting in the relaxation of all the muscles and organs of the body. This is accomplished by slow rhythmic breathing concentrated in the diaphragm muscle rather than the chest.    How to do proper relaxation breathing:    Start by lying on your back or reclining in a chair in a relaxed position. Place one hand on your chest and the other on your abdomen.  Relax your jaw by placing your tongue on the floor of your mouth and keeping your teeth slightly apart.   Take a deep breath in, letting the abdomen expand and rise while you keep your upper chest, neck and shoulders relaxed.   As you breathe out, allow your abdomen and chest to fall. Exhale completely.  It doesn't matter if you breathe in/out through your nose and/or mouth. Do whichever feels comfortable.  Remember to breathe slowly.  Do not force your breathing. Do not hold your breath.  Repeat while doing stretches listed below:         "single knee to chest" - lay on your back, use your hands to pull your left knee to your chest, keeping the right leg straight on the bed. Hold this pose while you incorporate " "your diaphragmatic breathing. Repeat on the opposite side. Complete 3 sets of 10 breaths on both legs.       "Happy Baby" - lay on your back. Open your knees slightly wider than your torso, then bring them up toward your armpits. Position each ankle directly over the knee, so your shins are perpendicular to the floor. Flex through the heels. Hold this pose while you incorporate your deep breathing. Complete 3 sets of 10 breaths.    HAPPY BABY MODIFICATION:        "Modified Happy Baby" - lay on your back, use your hands to pull your knees to your chest, then bring them out wide (about even with your shoulders). Hold this pose while you incorporate your deep breathing. Complete 3 sets of 10 breaths.             "

## 2022-11-22 NOTE — PLAN OF CARE
Attestation signed by Poly Sutherland PT at 2022  3:45 PM     I certify that I was present in the room directing the student in service delivery and guiding them using my skilled judgment. As the co-signing therapist I have reviewed the students documentation and am responsible for the treatment, assessment, and plan.        Poly Sutherland PT, DPT  Physical Therapy                              [facility]  Pelvic Health Physical Therapy Initial Evaluation     Date: 2022   Name: Shy Cuadra  Clinic Number: [id number]  Therapy Diagnosis:        Encounter Diagnoses   Name Primary?    Urinary frequency      Mixed incontinence urge and stress      Pelvic floor dysfunction      Dysfunctional voiding of urine        Physician: KIMBERLY Lui,*     Physician Orders: PT Eval and Treat   Medical Diagnosis from Referral:   R35.0 (ICD-10-CM) - Urinary frequency   N39.46 (ICD-10-CM) - Mixed incontinence urge and stress      Evaluation Date: 2022  Authorization Period Expiration: 2023  Plan of Care Expiration: 2023  Visit # / Visits authorized:      Time In: 11:10 am  Time Out: 12:00  Total Appointment Time (timed & untimed codes): 50 minutes     Precautions: endometrial cancer      Subjective      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      History of current condition - Shy reports: Patient has endometrial cancer. Met with oncologist in October, sounds like all she can do is have a hysterectomy which is scheduled for . Has had both urinary and fecal incontinence. Had a CT scan done which came back clear.      OB/GYN History:   had an episiotomy  Sexually active? Yes  Pain with vaginal exams, intercourse or tampon use? Yes, pain ever since she started having intercourse.      Pain:  Pain due to endometriosis     Bladder/Bowel History: urinary and fecal incontinence  Frequency  of urination: depends on how much fluid she drinks  Daytime: 5 times a day if she does not drink a lot. If she drinks a lot, 8-9X/day                     Nighttime: not typically   Difficulty initiating urine stream: No  Urine stream: strong  Complete emptying: No, feels like she may have to go five minutes later  Bladder leakage: Yes  Frequency of incidents: every time she coughs/sneezes. Daily   Amount leaked (urine): varies from a few drops to a large amount, never full emptying.   Urinary Urgency: Yes, Able to delay when sitting  Frequency of bowel movements: once a day  Difficulty initiating BM: No  Quality/Shape of BM:  varies   Does Patient Feel Empty after BM? Varies   Fiber Supplements or Laxative Use? No, but having a colonoscopy done on December 9th- MD told her to start using flaxseed   Colon leakage: Yes  Frequency of incidents: was happening more often, now that she has changed to a Paleo diet, it has helped tremendously. If she has too much gluten, that is when it flares her up. It has been a long time since she has had incontinence.    Amount leaked (bowels): full movement  Form of protection: panty liner  Number of pads required in 24 hours: changes just to freshen up, 4-5X a day         Medical History: Shy  has a past medical history of Anxiety, Back pain, Digestive disorder, Eczema, Foot pain, History of chicken pox, Rash, Skin lesion, and TMJ (dislocation of temporomandibular joint).      Surgical History: Shy Cuadra  has a past surgical history that includes Cyst Removal; laparoscopy for endometriosis; Tonsillectomy (01/01/2013); Breast surgery; Endometrial ablation (N/A, 9/30/2022); Hysteroscopy with dilation and curettage of uterus (N/A, 9/30/2022); and Hysteroscopic resection of myoma (9/30/2022).     Medications: Shy has a current medication list which includes the following prescription(s): albuterol, azithromycin, escitalopram oxalate, flovent hfa, and misoprostol.     Allergies:         Review of patient's allergies indicates:   Allergen Reactions    Latex, natural rubber Rash         Imaging CT scan films: endometrial cancer found, further imaging for fecal/urinary incontinence in regards to possible spread of cancer came back clear      Prior Therapy/Previous treatment included: none for this condition.  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  Prior Level of Function: independent   Current Level of Function: see above     Types of fluid intake: three bottles a day of 16.9 oz, also drinks three mini coke zeros and one cup of coffee in the morning  Diet: tries to be have a gluten free/paleo diet    Habitus: well developed, well nourished  Abuse/Neglect: No      Pts goals: reduce incontinence and provide increased muscle control of pelvic floor prior to hysterectomy     OBJECTIVE      See EMR under MEDIA for written consent provided 11/22/2022  Chaperone: declined        BREATHING MECHANICS ASSESSMENT   Thorax Assessment During Quiet Respiration: WNL excursion of ribcage and WNL excursion of abdominal wall  Thorax Assessment During Deep Respiration: WNL excursion of ribcage and WNL excursion of abdominal wall     VAGINAL PELVIC FLOOR EXAM     EXTERNAL ASSESSMENT  Introitus: WNL  Skin condition: WNL  Scarring: none   Sensation: WNL   Pain: none  Voluntary contraction: accessory muscle use, minimal visible lift   Voluntary relaxation: visible drop  Involuntary contraction: visible drop  Bearing down: visible drop  Perineal descent: absent                            INTERNAL ASSESSMENT  Pain: tender areas noted as follows: left pelvic floor   Sensation: able to localized pressure appropriately   Vaginal vault: decreased length   Muscle Bulk: hypertonus   Muscle Power: 3/5  Muscle Endurance: 6 sec  # Reps To Fatigue: to be assessed   Fast Contractions in 10  seconds: to be assessed                Quality of contraction: decreased hold, incomplete relaxation  Specificity: patient contracts: adductors and gluteals   Coordination: tends to hold breath during PFM contration   Prolapse check: to be assessed  Comments: hard stool palpated through vaginal wall     Limitation/Restriction for FOTO CMS Impairment/Limitation/Restriction for Urinary Problem Survey     Therapist reviewed FOTO scores for Shy Cuadra on 11/22/2022.   FOTO documents entered into [employer] - see Media section.     Limitation Score: 57%         TREATMENT      Treatment Time In: 11:30  Treatment Time Out: 12:00  Total Treatment time (time-based codes) separate from Evaluation: 30 minutes     Therapeutic Activity Patient participated in dynamic functional therapeutic activities to improve functional performance for 30 minutes. Including: Education as described below.         Patient Education provided:   general anatomy/physiology of urinary/ bowel  system and benefits of treatment were discussed with the pt. Additionally, bladder irritants, anatomy/physiology of pelvic floor, posture/body mechanices, diaphragmatic breathing, double voiding techniques, kegels, proper bearing down techniques, fluid intake/dietary modifications, behavior modifications, and Coordination of kegels with functional activities such as cough, laugh, sneeze, lift, etc.  were reviewed.      Home Exercises provided:  Written Home Exercises provided: Patient instructed to cont prior HEP.  Exercises were reviewed and Shy was able to demonstrate them prior to the end of the session.    Shy demonstrated good  understanding of the education provided.      See EMR under Patient Instructions for exercises provided 11/22/2022.     Assessment      Shy is a 57 y.o. female referred to outpatient Physical Therapy with a medical diagnosis of   . Pt presents with pelvic asymmetry, poor knowledge of body mechanics and posture, pelvic floor  tenderness, decreased pelvic muscle strength, decreased endurance of the pelvic muscles, decreased phasic ability of the pelvic muscles, increased tension of the pelvic muscles, poor quality of pelvic muscle contraction, increased frequency of urination, poor coordination of pelvic floor muscles during ADL's leading to urinary or fecal leakage, poor fluid intake, poor diet, incomplete urination, dysfunctional voiding, dysfunctional defecation, and unable to co-contract or co-relax abdominal wall and pelvic floor muscles.      Pt prognosis is Good.   Pt will benefit from skilled outpatient Physical Therapy to address the deficits stated above and in the chart below, provide pt/family education, and to maximize pt's level of independence.      Plan of care discussed with patient: Yes  Pt's spiritual, cultural and educational needs considered and patient is agreeable to the plan of care and goals as stated below:      Anticipated Barriers for therapy: upcoming hysterectomy on 12/19/2023     Medical Necessity is demonstrated by the following:     History  Co-morbidities and personal factors that may impact the plan of care Co-morbidities    has a past medical history of Anxiety, Back pain, Digestive disorder, Eczema, Foot pain, History of chicken pox, Rash, Skin lesion, and TMJ (dislocation of temporomandibular joint).   has a past surgical history that includes Cyst Removal; laparoscopy for endometriosis; Tonsillectomy (01/01/2013); Breast surgery; Endometrial ablation (N/A, 9/30/2022); Hysteroscopy with dilation and curettage of uterus (N/A, 9/30/2022); and Hysteroscopic resection of myoma (9/30/2022).  consumption of bladder irritants, coping style/mechanism, endometriosis, and history of cancer     Personal Factors  lifestyle       moderate   Examination  Body structures and functions, activity limitations and participation restrictions that may impact the plan of care Body Regions/Systems/Functions:    pelvic  asymmetry, poor knowledge of body mechanics and posture, pelvic floor tenderness, decreased pelvic muscle strength, decreased endurance of the pelvic muscles, decreased phasic ability of the pelvic muscles, increased tension of the pelvic muscles, poor quality of pelvic muscle contraction, increased frequency of urination, poor coordination of pelvic floor muscles during ADL's leading to urinary or fecal leakage, poor fluid intake, poor diet, incomplete urination, dysfunctional voiding, dysfunctional defecation, and unable to co-contract or co-relax abdominal wall and pelvic floor muscles      Activity Limitations:  urgency , delaying urge to urinate, delaying urge to urinate or have a BM, bearing down for BM, full bladder emptying, intercourse/vaginal exam/tampon use without pain, incontinence with ADLs, and Participating in social activities and ADLs due to pelvic pressure     Participation Restrictions:  ADLs affected by inability to fully empty bladder , well woman's exam, ADL participation affected by pain, work duties, and difficulty starting urine stream or fully emptying bladder      Activity limitations:   Learning and applying knowledge  no deficits     General Tasks and Commands  no deficits     Communication  no deficits     Mobility  no deficits     Self care  toileting     Domestic Life  shopping  doing house work (cleaning house, washing dishes, laundry)     Interactions/Relationships  formal relationships  intimate relationships     Life Areas  employment     Community and Social Life  community life  recreation and leisure          moderate   Clinical Presentation unstable clinical presentation with unpredictable characteristics moderate   Decision Making/ Complexity Score: moderate      Goals:  Short Term Goals: 6 weeks   - Pt will demonstrate excellent knowledge and adherence to HEP to facilitate optimal recovery prior to hysterectomy.  - Pt will demonstrate proper PFM contraction, relaxation, and  lengthening coordinated with TA and breath for improved muscle coordination needed for functional activity.     Long Term Goals: 12 weeks   - Pt will demonstrate excellent knowledge and adherence to HEP for continued self-maintenance of symptoms.  - Pt will report FOTO score of 44% limited or less indicating clinically relevant increase in function.  - Pt will report voiding interval of 2-3 hours for improved ADL tolerance.  - Pt will report ability to delay urinary urge for at least 15 minutes to maintain continence with ADL/IADLs.   - Pt will report minimal to no incidence of urinary or fecal  incontinence 6/7 days for improved hygiene and ADL/IADL tolerance.   - Pt will report needing </= 1 pad/day indicating improved PFM function needed to maintain continence  - Pt will demonstrate PFM strength of at least 3/5 MMT with appropriate specificity for improved strength needed to maintain continence.   - Pt will report bearing down appropriately 100% of the time for improved bowel function and decreased stress on adjacent pelvic structures.   - Pt will be able to successfully complete sexual intercourse without pain for improved ADL tolerance.   - Pt will report ability to tolerate speculum exam without pain for improved access to healthcare.        Plan      Plan of care Certification: 11/22/2022 to 03/17/2023.     Outpatient Physical Therapy 1 times weekly for 16 weeks to include the following interventions: therapeutic exercises, therapeutic activity, neuromuscular re-education, manual therapy, modalities PRN, patient/family education, dry needling, and self care/home management     Dione Alonso, SPT            Cosigned by: Poly Sutherland, PT at 11/22/2022  3:45 PM

## 2022-12-09 LAB — CRC RECOMMENDATION EXT: NORMAL

## 2022-12-19 PROBLEM — C54.1 ENDOMETRIAL CARCINOMA: Status: ACTIVE | Noted: 2022-12-19

## 2022-12-22 ENCOUNTER — PATIENT OUTREACH (OUTPATIENT)
Dept: ADMINISTRATIVE | Facility: HOSPITAL | Age: 57
End: 2022-12-22
Payer: COMMERCIAL

## 2022-12-28 ENCOUNTER — OFFICE VISIT (OUTPATIENT)
Dept: FAMILY MEDICINE | Facility: CLINIC | Age: 57
End: 2022-12-28
Payer: COMMERCIAL

## 2022-12-28 ENCOUNTER — TELEPHONE (OUTPATIENT)
Dept: FAMILY MEDICINE | Facility: CLINIC | Age: 57
End: 2022-12-28
Payer: COMMERCIAL

## 2022-12-28 DIAGNOSIS — J02.9 PHARYNGITIS, UNSPECIFIED ETIOLOGY: Primary | ICD-10-CM

## 2022-12-28 PROCEDURE — 1159F PR MEDICATION LIST DOCUMENTED IN MEDICAL RECORD: ICD-10-PCS | Mod: CPTII,95,, | Performed by: FAMILY MEDICINE

## 2022-12-28 PROCEDURE — 99213 PR OFFICE/OUTPT VISIT, EST, LEVL III, 20-29 MIN: ICD-10-PCS | Mod: 95,,, | Performed by: FAMILY MEDICINE

## 2022-12-28 PROCEDURE — 1160F RVW MEDS BY RX/DR IN RCRD: CPT | Mod: CPTII,95,, | Performed by: FAMILY MEDICINE

## 2022-12-28 PROCEDURE — 99213 OFFICE O/P EST LOW 20 MIN: CPT | Mod: 95,,, | Performed by: FAMILY MEDICINE

## 2022-12-28 PROCEDURE — 1160F PR REVIEW ALL MEDS BY PRESCRIBER/CLIN PHARMACIST DOCUMENTED: ICD-10-PCS | Mod: CPTII,95,, | Performed by: FAMILY MEDICINE

## 2022-12-28 PROCEDURE — 1159F MED LIST DOCD IN RCRD: CPT | Mod: CPTII,95,, | Performed by: FAMILY MEDICINE

## 2022-12-28 RX ORDER — AZITHROMYCIN 250 MG/1
TABLET, FILM COATED ORAL
Qty: 6 TABLET | Refills: 0 | Status: SHIPPED | OUTPATIENT
Start: 2022-12-28 | End: 2022-12-28

## 2022-12-28 RX ORDER — AZITHROMYCIN 250 MG/1
TABLET, FILM COATED ORAL
Qty: 6 TABLET | Refills: 0 | Status: SHIPPED | OUTPATIENT
Start: 2022-12-28 | End: 2023-01-02

## 2022-12-28 NOTE — TELEPHONE ENCOUNTER
1230 today  ----- Message from Azucena Kellie sent at 12/28/2022  9:24 AM CST -----  Contact: pt  Type: Same Day Appointment    Caller is requesting a same day appointment.  Caller declined first available appt listed below.    Name of caller:pt  When is the first available appt:12/29/2022  Symptoms:cough, sore throat,   Best Call back number:[phone]    Additional Info:Pt just had a hysterectomy on 12/19/2022 and she can't come into the office. She would like to do a virtual visit.       Please advise-Thank you~

## 2022-12-30 NOTE — PROGRESS NOTES
Subjective:       Patient ID: Shy Cuadra is a 57 y.o. female.    Chief Complaint: Sore Throat and Headache (conj)    Virtual visit.  10 minutes.  Patient is at her home here in [place].  Has been ill for about 5 days.  Pharyngitis and cough.  In congestion.  She is now become ill.  Had a complete hysterectomy on December 19th.  Headache sore throat slight congestion.  But not coughing yet.  Some headache no fever chills.  No history of asthma.  Nonsmoker.  No COVID vaccine.  And no flu shot.  No shortness of breath.  No known drug allergies.    Physical examination.  Well-developed well-nourished female no acute distress.            Objective:        Assessment:       1. Pharyngitis, unspecified etiology          Plan:       Pharyngitis, unspecified etiology    Other orders  -     Discontinue: azithromycin (Z-RUFUS) 250 MG tablet; Take 2 tablets by mouth on day 1; Take 1 tablet by mouth on days 2-5  Dispense: 6 tablet; Refill: 0  -     azithromycin (Z-RUFUS) 250 MG tablet; Take 2 tablets by mouth on day 1; Take 1 tablet by mouth on days 2-5  Dispense: 6 tablet; Refill: 0    Zithromax Z-Rufus.  Sent to her pharmacy.  Delsym p.ana for cough.  She does have some hydrocodone left from her hysterectomy.  5 mg pills.  She can use a half of 1 of these up to every 6 hours as needed for cough if needed.

## 2023-02-02 ENCOUNTER — PATIENT MESSAGE (OUTPATIENT)
Dept: ADMINISTRATIVE | Facility: HOSPITAL | Age: 58
End: 2023-02-02
Payer: COMMERCIAL

## 2023-02-27 ENCOUNTER — PATIENT MESSAGE (OUTPATIENT)
Dept: ADMINISTRATIVE | Facility: HOSPITAL | Age: 58
End: 2023-02-27
Payer: COMMERCIAL

## 2023-03-01 ENCOUNTER — PATIENT OUTREACH (OUTPATIENT)
Dept: ADMINISTRATIVE | Facility: HOSPITAL | Age: 58
End: 2023-03-01
Payer: COMMERCIAL

## 2023-03-01 NOTE — PROGRESS NOTES
Mammogram GAP report-I spoke to pt regarding her overdue Mammogram and she stated she does not want to have a Mammogram.

## 2023-04-19 DIAGNOSIS — Z12.31 OTHER SCREENING MAMMOGRAM: ICD-10-CM

## 2023-04-24 ENCOUNTER — PATIENT MESSAGE (OUTPATIENT)
Dept: ADMINISTRATIVE | Facility: HOSPITAL | Age: 58
End: 2023-04-24
Payer: COMMERCIAL

## 2023-05-03 ENCOUNTER — PATIENT MESSAGE (OUTPATIENT)
Dept: ADMINISTRATIVE | Facility: HOSPITAL | Age: 58
End: 2023-05-03
Payer: COMMERCIAL

## 2023-05-16 ENCOUNTER — PATIENT MESSAGE (OUTPATIENT)
Dept: ADMINISTRATIVE | Facility: HOSPITAL | Age: 58
End: 2023-05-16
Payer: COMMERCIAL

## 2023-05-31 ENCOUNTER — PATIENT MESSAGE (OUTPATIENT)
Dept: ADMINISTRATIVE | Facility: HOSPITAL | Age: 58
End: 2023-05-31
Payer: COMMERCIAL

## 2023-06-21 ENCOUNTER — PATIENT MESSAGE (OUTPATIENT)
Dept: ADMINISTRATIVE | Facility: HOSPITAL | Age: 58
End: 2023-06-21
Payer: COMMERCIAL

## 2023-08-31 ENCOUNTER — PATIENT MESSAGE (OUTPATIENT)
Dept: ADMINISTRATIVE | Facility: HOSPITAL | Age: 58
End: 2023-08-31
Payer: COMMERCIAL

## 2023-09-04 ENCOUNTER — OCCUPATIONAL HEALTH (OUTPATIENT)
Dept: URGENT CARE | Facility: CLINIC | Age: 58
End: 2023-09-04

## 2023-09-04 DIAGNOSIS — Z00.00 ENCOUNTER FOR PHYSICAL EXAMINATION: Primary | ICD-10-CM

## 2023-09-04 LAB — COLLECTION ONLY: NORMAL

## 2023-09-04 PROCEDURE — 99499 DOT PHYSICAL: ICD-10-PCS | Mod: S$GLB,,, | Performed by: NURSE PRACTITIONER

## 2023-09-04 PROCEDURE — 99499 UNLISTED E&M SERVICE: CPT | Mod: S$GLB,,, | Performed by: NURSE PRACTITIONER

## 2023-09-14 ENCOUNTER — PATIENT MESSAGE (OUTPATIENT)
Dept: ADMINISTRATIVE | Facility: HOSPITAL | Age: 58
End: 2023-09-14
Payer: COMMERCIAL

## 2023-11-01 ENCOUNTER — PATIENT MESSAGE (OUTPATIENT)
Dept: ADMINISTRATIVE | Facility: HOSPITAL | Age: 58
End: 2023-11-01
Payer: COMMERCIAL

## 2024-01-05 ENCOUNTER — PATIENT MESSAGE (OUTPATIENT)
Dept: ADMINISTRATIVE | Facility: HOSPITAL | Age: 59
End: 2024-01-05
Payer: COMMERCIAL

## 2024-04-30 ENCOUNTER — OFFICE VISIT (OUTPATIENT)
Dept: FAMILY MEDICINE | Facility: CLINIC | Age: 59
End: 2024-04-30
Payer: COMMERCIAL

## 2024-04-30 VITALS
WEIGHT: 149 LBS | HEIGHT: 64 IN | DIASTOLIC BLOOD PRESSURE: 78 MMHG | SYSTOLIC BLOOD PRESSURE: 122 MMHG | OXYGEN SATURATION: 96 % | BODY MASS INDEX: 25.44 KG/M2 | RESPIRATION RATE: 18 BRPM | TEMPERATURE: 98 F | HEART RATE: 71 BPM

## 2024-04-30 DIAGNOSIS — Z00.00 ANNUAL PHYSICAL EXAM: ICD-10-CM

## 2024-04-30 DIAGNOSIS — R53.83 FATIGUE, UNSPECIFIED TYPE: ICD-10-CM

## 2024-04-30 DIAGNOSIS — G47.30 SLEEP APNEA, UNSPECIFIED TYPE: ICD-10-CM

## 2024-04-30 DIAGNOSIS — Z12.31 BREAST CANCER SCREENING BY MAMMOGRAM: ICD-10-CM

## 2024-04-30 DIAGNOSIS — C54.1 ENDOMETRIAL SARCOMA: ICD-10-CM

## 2024-04-30 DIAGNOSIS — F41.9 ANXIETY: Primary | ICD-10-CM

## 2024-04-30 DIAGNOSIS — M79.671 PAIN OF RIGHT HEEL: ICD-10-CM

## 2024-04-30 PROCEDURE — 99214 OFFICE O/P EST MOD 30 MIN: CPT | Mod: 25,S$GLB,, | Performed by: NURSE PRACTITIONER

## 2024-04-30 PROCEDURE — 3008F BODY MASS INDEX DOCD: CPT | Mod: CPTII,S$GLB,, | Performed by: NURSE PRACTITIONER

## 2024-04-30 PROCEDURE — 1159F MED LIST DOCD IN RCRD: CPT | Mod: CPTII,S$GLB,, | Performed by: NURSE PRACTITIONER

## 2024-04-30 PROCEDURE — 1160F RVW MEDS BY RX/DR IN RCRD: CPT | Mod: CPTII,S$GLB,, | Performed by: NURSE PRACTITIONER

## 2024-04-30 PROCEDURE — 3074F SYST BP LT 130 MM HG: CPT | Mod: CPTII,S$GLB,, | Performed by: NURSE PRACTITIONER

## 2024-04-30 PROCEDURE — 3078F DIAST BP <80 MM HG: CPT | Mod: CPTII,S$GLB,, | Performed by: NURSE PRACTITIONER

## 2024-04-30 PROCEDURE — 99396 PREV VISIT EST AGE 40-64: CPT | Mod: S$GLB,,, | Performed by: NURSE PRACTITIONER

## 2024-04-30 PROCEDURE — 99999 PR PBB SHADOW E&M-EST. PATIENT-LVL V: CPT | Mod: PBBFAC,,, | Performed by: NURSE PRACTITIONER

## 2024-04-30 RX ORDER — ESCITALOPRAM OXALATE 10 MG/1
10 TABLET ORAL DAILY
Qty: 90 TABLET | Refills: 1 | Status: SHIPPED | OUTPATIENT
Start: 2024-04-30 | End: 2025-04-30

## 2024-04-30 RX ORDER — ESCITALOPRAM OXALATE 20 MG/1
20 TABLET ORAL DAILY
COMMUNITY
Start: 2024-03-31 | End: 2024-04-30 | Stop reason: SDUPTHER

## 2024-04-30 RX ORDER — PHENTERMINE HYDROCHLORIDE 37.5 MG/1
37.5 TABLET ORAL EVERY MORNING
COMMUNITY
Start: 2024-04-26 | End: 2024-04-30

## 2024-04-30 RX ORDER — ESCITALOPRAM OXALATE 20 MG/1
20 TABLET ORAL DAILY
Qty: 90 TABLET | Refills: 1 | Status: SHIPPED | OUTPATIENT
Start: 2024-04-30

## 2024-04-30 NOTE — PROGRESS NOTES
SUBJECTIVE:      Patient ID: Shy Cuadra is a 58 y.o. female.    Chief Complaint: Annual Exam    HPI  Vss; bp is well controlled  Denies chest pain  Denies sob  Denies fever  Denies chills    Is here for annual  Lexapro is working well takes 15 mg daily  Nonsuicidal    Right heel hurts at times  Will x ray and send to dr sewell pod    Has fatigue  Does have some sleep apnea as well  Does not go outside a whole lot    M[facility]ogram due- [facility]    Wants t dap    Is also in menopause  Is on tirzepatide 5mg daily- encouraging nutrient dense meals    [facility]    Past Surgical History:   Procedure Laterality Date    BREAST SURGERY      CYST REMOVAL      left breast    CYSTOSCOPY N/A 12/19/2022    Procedure: CYSTOSCOPY;  Surgeon: Kristen Byrne MD;  Location: [facility] OR;  Service: OB/GYN;  Laterality: N/A;    ENDOMETRIAL ABLATION N/A 9/30/2022    Procedure: ABLATION, ENDOMETRIUM;  Surgeon: Anup Oro MD;  Location: [facility] OR;  Service: OB/GYN;  Laterality: N/A;    HYSTEROSCOPIC RESECTION OF MYOMA  9/30/2022    Procedure: MYOMECTOMY, HYSTEROSCOPIC;  Surgeon: Anup Oro MD;  Location: [facility] OR;  Service: OB/GYN;;    HYSTEROSCOPY WITH DILATION AND CURETTAGE OF UTERUS N/A 9/30/2022    Procedure: HYSTEROSCOPY, WITH DILATION AND CURETTAGE OF UTERUS;  Surgeon: Anup Oro MD;  Location: [facility] OR;  Service: OB/GYN;  Laterality: N/A;    laparoscopy for endometriosis      LYMPH NODE BIOPSY Bilateral 12/19/2022    Procedure: BIOPSY, LYMPH NODE- Groin SENTINEL;  Surgeon: Kristen Byrne MD;  Location: [facility] OR;  Service: OB/GYN;  Laterality: Bilateral;    ROBOT-ASSISTED LAPAROSCOPIC HYSTERECTOMY N/A 12/19/2022    Procedure: ROBOTIC HYSTERECTOMY-ERAS;  Surgeon: Kristen Byrne MD;  Location: [facility] OR;  Service: OB/GYN;  Laterality: N/A;    ROBOT-ASSISTED LAPAROSCOPIC SALPINGO-OOPHORECTOMY Bilateral 12/19/2022    Procedure: ROBOTIC SALPINGO-OOPHORECTOMY;  Surgeon: Kristen Byrne MD;   Location: [facility];  Service: OB/GYN;  Laterality: Bilateral;    TONSILLECTOMY  01/01/2013     Family History   Problem Relation Name Age of Onset    Heart disease Father      Heart attack Father          at age 55    Hypertension Father      Osteoporosis Maternal Grandmother        Social History     Socioeconomic History    Marital status:      Spouse name: Ronn    Number of children: 1   Tobacco Use    Smoking status: Never     Passive exposure: Never    Smokeless tobacco: Never   Substance and Sexual Activity    Alcohol use: Yes     Comment: wine-occasionally    Sexual activity: Yes     Partners: Male     Current Outpatient Medications   Medication Sig Dispense Refill    albuterol (PROVENTIL/VENTOLIN HFA) 90 mcg/actuation inhaler Inhale 1 puff into the lungs every 4 (four) hours as needed.      FLOVENT  mcg/actuation inhaler Inhale 1 puff into the lungs every 12 (twelve) hours as needed.      tirzepatide, weight loss, 5 mg/0.5 mL PnIj Inject 5 mg into the skin every 7 days.      EScitalopram oxalate (LEXAPRO) 10 MG tablet Take 1 tablet (10 mg total) by mouth once daily. 90 tablet 1    EScitalopram oxalate (LEXAPRO) 20 MG tablet Take 1 tablet (20 mg total) by mouth once daily. 90 tablet 1     No current facility-administered medications for this visit.     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    Foot pain     History of chicken pox     History of colonoscopy 12/09/2022    Rash     Skin lesion     TMJ (dislocation of temporomandibular joint)      Past Surgical History:   Procedure Laterality Date    BREAST SURGERY      CYST REMOVAL      left breast    CYSTOSCOPY N/A 12/19/2022    Procedure: CYSTOSCOPY;  Surgeon: Kristen Byrne MD;  Location: [facility];  Service: OB/GYN;   "Laterality: N/A;    ENDOMETRIAL ABLATION N/A 9/30/2022    Procedure: ABLATION, ENDOMETRIUM;  Surgeon: Anup Oro MD;  Location: [facility] OR;  Service: OB/GYN;  Laterality: N/A;    HYSTEROSCOPIC RESECTION OF MYOMA  9/30/2022    Procedure: MYOMECTOMY, HYSTEROSCOPIC;  Surgeon: Anup Oro MD;  Location: [facility] OR;  Service: OB/GYN;;    HYSTEROSCOPY WITH DILATION AND CURETTAGE OF UTERUS N/A 9/30/2022    Procedure: HYSTEROSCOPY, WITH DILATION AND CURETTAGE OF UTERUS;  Surgeon: Anup Oro MD;  Location: [facility] OR;  Service: OB/GYN;  Laterality: N/A;    laparoscopy for endometriosis      LYMPH NODE BIOPSY Bilateral 12/19/2022    Procedure: BIOPSY, LYMPH NODE- Groin SENTINEL;  Surgeon: Kristen Byrne MD;  Location: [facility];  Service: OB/GYN;  Laterality: Bilateral;    ROBOT-ASSISTED LAPAROSCOPIC HYSTERECTOMY N/A 12/19/2022    Procedure: ROBOTIC HYSTERECTOMY-ERAS;  Surgeon: Kristen Byrne MD;  Location: [facility] OR;  Service: OB/GYN;  Laterality: N/A;    ROBOT-ASSISTED LAPAROSCOPIC SALPINGO-OOPHORECTOMY Bilateral 12/19/2022    Procedure: ROBOTIC SALPINGO-OOPHORECTOMY;  Surgeon: Kristen Byrne MD;  Location: [facility];  Service: OB/GYN;  Laterality: Bilateral;    TONSILLECTOMY  01/01/2013       Review of Systems   Constitutional:  Positive for fatigue.   Musculoskeletal:         Heel pain but not at moment   All other systems reviewed and are negative.   OBJECTIVE:      Vitals:    04/30/24 0859   BP: 122/78   BP Location: Left arm   Patient Position: Sitting   BP Method: Medium (Manual)   Pulse: 71   Resp: 18   Temp: 97.8 °F (36.6 °C)   SpO2: 96%   Weight: 67.6 kg (149 lb)   Height: 5' 3.5" (1.613 m)     Physical Exam  Vitals and nursing note reviewed.   Constitutional:       Appearance: Normal appearance. She is normal weight.   HENT:      Head: Normocephalic and atraumatic.   Eyes:      Pupils: Pupils are equal, round, and reactive to light.   Neck:      Comments: No thrills no bruits  No abnomral " neck masses felt  Cardiovascular:      Rate and Rhythm: Normal rate and regular rhythm.      Pulses: Normal pulses.      Heart sounds: Normal heart sounds.      Comments: S1 s2 nsr  No edema noted on bilateral lower ext  Pulmonary:      Effort: Pulmonary effort is normal.      Breath sounds: Normal breath sounds.   Musculoskeletal:      Cervical back: Normal range of motion.      Comments: Heel does not hurt today  No abnormalities seen or felt   Skin:     General: Skin is warm and dry.   Neurological:      General: No focal deficit present.      Mental Status: She is alert. Mental status is at baseline.   Psychiatric:         Mood and Affect: Mood normal.         Behavior: Behavior normal.         Thought Content: Thought content normal.         Judgment: Judgment normal.      Comments: nonsuicidal      Assessment:       1. Anxiety    2. Annual physical exam    3. Fatigue, unspecified type    4. Pain of right heel    5. Sleep apnea, unspecified type    6. Breast cancer screening by mammogram        Plan:       Anxiety  -     EScitalopram oxalate (LEXAPRO) 10 MG tablet; Take 1 tablet (10 mg total) by mouth once daily.  Dispense: 90 tablet; Refill: 1  -     EScitalopram oxalate (LEXAPRO) 20 MG tablet; Take 1 tablet (20 mg total) by mouth once daily.  Dispense: 90 tablet; Refill: 1    Annual physical exam  -     Comprehensive Metabolic Panel; Future; Expected date: 04/30/2024  -     CBC Auto Differential; Future; Expected date: 04/30/2024  -     Lipid Panel; Future; Expected date: 04/30/2024  -     TSH; Future; Expected date: 04/30/2024    Fatigue, unspecified type  -     TSH; Future; Expected date: 04/30/2024  -     Vitamin D; Future; Expected date: 04/30/2024  -     ESTROGENS, TOTAL; Future; Expected date: 04/30/2024  -     PROGESTERONE; Future; Expected date: 04/30/2024  -     Testosterone, free; Future; Expected date: 04/30/2024    Pain of right heel  -     Ambulatory referral/consult to Podiatry; Future; Expected  date: 04/30/2024  -     X-Ray Foot 2 View Right; Future; Expected date: 04/30/2024    Sleep apnea, unspecified type  -     Home Sleep Study; Future    Breast cancer screening by mammogram    Take medications as ordered  Get mamogram  Get foot x ray and see podiatry  Get fasting labs at [facility]  Home sleep study  T dap today  Follow up in 6 months to one year or sooner if needed  No follow-ups on file.      4/30/2024 AN Perez, FNP

## 2024-06-04 ENCOUNTER — TELEPHONE (OUTPATIENT)
Dept: FAMILY MEDICINE | Facility: CLINIC | Age: 59
End: 2024-06-04

## 2024-06-04 DIAGNOSIS — R53.83 FATIGUE, UNSPECIFIED TYPE: ICD-10-CM

## 2024-06-04 DIAGNOSIS — Z00.00 ANNUAL PHYSICAL EXAM: Primary | ICD-10-CM

## 2024-06-04 DIAGNOSIS — Z12.31 BREAST CANCER SCREENING BY MAMMOGRAM: Primary | ICD-10-CM

## 2024-06-04 NOTE — TELEPHONE ENCOUNTER
----- Message from Ana Castañeda sent at 6/4/2024 11:51 AM CDT -----  Contact: imaging  Type: Needs Medical Advice         Who Called:[facility]- Catrina   [place] Call Back Number: [phone] ext [phone]  Additional Information: Requesting a call back regarding They are asking for orders for basic mammo screening. Pt set for tomorrow to have done.     Fax# [phone]    Please Advise- Thank you

## 2024-06-04 NOTE — TELEPHONE ENCOUNTER
----- Message from David Cordero, Patient Care Assistant sent at 6/4/2024  8:44 AM CDT -----  Contact: Pt  Type: Needs Medical Advice    Who Called: Pt  Best Call Back Number: [phone]  Inquiry/Question: Pt is requesting her lab orders be placed on her chart to have them drawn at [facility] and not [facility]. Please advise Thank you~

## 2024-06-04 NOTE — TELEPHONE ENCOUNTER
----- Message from Tammie Arroyo sent at 6/4/2024  3:59 PM CDT -----  Contact: Ting from [facility]  Type: Orders Request    What orders/ testing are being requested?   Mammogram    Is there a future appointment scheduled for the patient with PCP?  Not until 2025    When?   4/2025    Would you prefer a response via [employer]?   Please call Ting - [phone] ext [phone]    Comments:  States patient is coming in tomorrow for mammogram, but they need orders - please call - thank you

## 2024-06-07 ENCOUNTER — LAB VISIT (OUTPATIENT)
Dept: LAB | Facility: HOSPITAL | Age: 59
End: 2024-06-07
Attending: NURSE PRACTITIONER
Payer: COMMERCIAL

## 2024-06-07 DIAGNOSIS — Z00.00 ANNUAL PHYSICAL EXAM: ICD-10-CM

## 2024-06-07 DIAGNOSIS — R53.83 FATIGUE, UNSPECIFIED TYPE: ICD-10-CM

## 2024-06-07 LAB
25(OH)D3+25(OH)D2 SERPL-MCNC: 40 NG/ML (ref 30–96)
ALBUMIN SERPL BCP-MCNC: 4.6 G/DL (ref 3.5–5.2)
ALP SERPL-CCNC: 48 U/L (ref 55–135)
ALT SERPL W/O P-5'-P-CCNC: 16 U/L (ref 10–44)
ANION GAP SERPL CALC-SCNC: 6 MMOL/L (ref 8–16)
AST SERPL-CCNC: 23 U/L (ref 10–40)
BASOPHILS # BLD AUTO: 0.04 K/UL (ref 0–0.2)
BASOPHILS NFR BLD: 0.9 % (ref 0–1.9)
BILIRUB SERPL-MCNC: 0.3 MG/DL (ref 0.1–1)
BUN SERPL-MCNC: 17 MG/DL (ref 6–20)
CALCIUM SERPL-MCNC: 9.9 MG/DL (ref 8.7–10.5)
CHLORIDE SERPL-SCNC: 105 MMOL/L (ref 95–110)
CHOLEST SERPL-MCNC: 195 MG/DL (ref 120–199)
CHOLEST/HDLC SERPL: 2.4 {RATIO} (ref 2–5)
CO2 SERPL-SCNC: 29 MMOL/L (ref 23–29)
CREAT SERPL-MCNC: 0.7 MG/DL (ref 0.5–1.4)
DIFFERENTIAL METHOD BLD: NORMAL
EOSINOPHIL # BLD AUTO: 0.1 K/UL (ref 0–0.5)
EOSINOPHIL NFR BLD: 2.6 % (ref 0–8)
ERYTHROCYTE [DISTWIDTH] IN BLOOD BY AUTOMATED COUNT: 14.5 % (ref 11.5–14.5)
EST. GFR  (NO RACE VARIABLE): >60 ML/MIN/1.73 M^2
GLUCOSE SERPL-MCNC: 89 MG/DL (ref 70–110)
HCT VFR BLD AUTO: 41.3 % (ref 37–48.5)
HDLC SERPL-MCNC: 80 MG/DL (ref 40–75)
HDLC SERPL: 41 % (ref 20–50)
HGB BLD-MCNC: 13.4 G/DL (ref 12–16)
IMM GRANULOCYTES # BLD AUTO: 0.01 K/UL (ref 0–0.04)
IMM GRANULOCYTES NFR BLD AUTO: 0.2 % (ref 0–0.5)
LDLC SERPL CALC-MCNC: 96.2 MG/DL (ref 63–159)
LYMPHOCYTES # BLD AUTO: 1.6 K/UL (ref 1–4.8)
LYMPHOCYTES NFR BLD: 34.5 % (ref 18–48)
MCH RBC QN AUTO: 30 PG (ref 27–31)
MCHC RBC AUTO-ENTMCNC: 32.4 G/DL (ref 32–36)
MCV RBC AUTO: 92 FL (ref 82–98)
MONOCYTES # BLD AUTO: 0.4 K/UL (ref 0.3–1)
MONOCYTES NFR BLD: 8.4 % (ref 4–15)
NEUTROPHILS # BLD AUTO: 2.5 K/UL (ref 1.8–7.7)
NEUTROPHILS NFR BLD: 53.4 % (ref 38–73)
NONHDLC SERPL-MCNC: 115 MG/DL
NRBC BLD-RTO: 0 /100 WBC
PLATELET # BLD AUTO: 216 K/UL (ref 150–450)
PMV BLD AUTO: 9.9 FL (ref 9.2–12.9)
POTASSIUM SERPL-SCNC: 4.3 MMOL/L (ref 3.5–5.1)
PROT SERPL-MCNC: 7.5 G/DL (ref 6–8.4)
RBC # BLD AUTO: 4.47 M/UL (ref 4–5.4)
SODIUM SERPL-SCNC: 140 MMOL/L (ref 136–145)
TRIGL SERPL-MCNC: 94 MG/DL (ref 30–150)
TSH SERPL DL<=0.005 MIU/L-ACNC: 1.78 UIU/ML (ref 0.34–5.6)
WBC # BLD AUTO: 4.67 K/UL (ref 3.9–12.7)

## 2024-06-07 PROCEDURE — 80061 LIPID PANEL: CPT | Performed by: NURSE PRACTITIONER

## 2024-06-07 PROCEDURE — 84144 ASSAY OF PROGESTERONE: CPT | Performed by: NURSE PRACTITIONER

## 2024-06-07 PROCEDURE — 84402 ASSAY OF FREE TESTOSTERONE: CPT | Performed by: NURSE PRACTITIONER

## 2024-06-07 PROCEDURE — 80053 COMPREHEN METABOLIC PANEL: CPT | Performed by: NURSE PRACTITIONER

## 2024-06-07 PROCEDURE — 82306 VITAMIN D 25 HYDROXY: CPT | Performed by: NURSE PRACTITIONER

## 2024-06-07 PROCEDURE — 36415 COLL VENOUS BLD VENIPUNCTURE: CPT | Performed by: NURSE PRACTITIONER

## 2024-06-07 PROCEDURE — 85025 COMPLETE CBC W/AUTO DIFF WBC: CPT | Performed by: NURSE PRACTITIONER

## 2024-06-07 PROCEDURE — 82672 ASSAY OF ESTROGEN: CPT | Performed by: NURSE PRACTITIONER

## 2024-06-07 PROCEDURE — 84443 ASSAY THYROID STIM HORMONE: CPT | Performed by: NURSE PRACTITIONER

## 2024-06-08 LAB — PROGEST SERPL-MCNC: 0.1 NG/ML

## 2024-06-10 LAB — ESTROGEN SERPL-MCNC: 38 PG/ML (ref 40–244)

## 2024-06-11 ENCOUNTER — PATIENT OUTREACH (OUTPATIENT)
Dept: ADMINISTRATIVE | Facility: HOSPITAL | Age: 59
End: 2024-06-11

## 2024-06-11 NOTE — PROGRESS NOTES
Population Health Chart Review & Patient Outreach Details      Additional [facility] Health Notes:               Updates Requested / Reviewed:      Updated Care Coordination Note, Care Everywhere, , and External Sources: [facility]         Health Maintenance Topics Overdue:      VB Score: 2     Mammogram  Hemoglobin A1c                       Health Maintenance Topic(s) Outreach Outcomes & Actions Taken:    Breast Cancer Screening - Outreach Outcomes & Actions Taken  : msg

## 2024-06-13 LAB — TESTOST FREE SERPL-MCNC: 0.5 PG/ML (ref 0–4.2)

## 2024-06-24 ENCOUNTER — TELEPHONE (OUTPATIENT)
Dept: FAMILY MEDICINE | Facility: CLINIC | Age: 59
End: 2024-06-24

## 2024-06-24 DIAGNOSIS — Z12.31 BREAST CANCER SCREENING BY MAMMOGRAM: Primary | ICD-10-CM

## 2024-06-24 NOTE — TELEPHONE ENCOUNTER
----- Message from Damari Rhoades sent at 6/24/2024  9:15 AM CDT -----  Regarding: orders needed  Contact: diagnostic imaging  Type: Needs Medical Advice  Who Called:  [facility]   Symptoms (please be specific):    How long has patient had these symptoms:    Pharmacy name and phone #:    Best Call Back Number: [phone]    Additional Information: the following needs a mammo order she going in tomorrow. Thanks.  Fax: [phone]

## 2024-06-25 ENCOUNTER — TELEPHONE (OUTPATIENT)
Dept: FAMILY MEDICINE | Facility: CLINIC | Age: 59
End: 2024-06-25

## 2024-06-25 NOTE — TELEPHONE ENCOUNTER
----- Message from Alise Martinez sent at 6/25/2024  1:50 PM CDT -----  Type:  Mammogram    Name of Caller:  Catrina from [facility]    Where would they like the mammogram performed?  [facility]    Would the patient rather a call back or a response via [facility]?  Call back    Best Call Back Number:  [phone] ext [phone]  Fax- [phone]    Additional Information:  Pt has an appt for 6/26/24 at 9:30. Needing to have mammogram orders faxed over as soon as possible for them to have before her appt.  Please call back to advise. Thanks!

## 2024-06-25 NOTE — TELEPHONE ENCOUNTER
----- Message from Miguel A Carias sent at 6/25/2024  2:58 PM CDT -----  Regarding: Mammo  Type: Needs Medical Advice     Who Called: PABLO Sawyer Call Back Number:[phone] ext [phone]     Additional Information: Requesting a call back regarding  The need orders faced for Mammo Screening Please.  Pt has appt tomorrow with [facility].         Fax# [phone]      Please send to [facility] it was placed in Epic pt has appt there tomorrow.  Please Advise- Thank you

## 2024-06-25 NOTE — TELEPHONE ENCOUNTER
----- Message from Ana Castañeda sent at 6/25/2024  9:13 AM CDT -----  Contact: [facility]  Type: Needs Medical Advice    Who Called: PABLO Borja    Best Call Back Number:[phone] ext [phone]    Additional Information: Requesting a call back regarding  The need orders faced for Mammo Screening Please.  Pt has appt tomorrow with NIKHIL.       Fax# [phone]     Please Advise- Thank you

## 2024-06-27 LAB — BCS RECOMMENDATION EXT: NORMAL

## 2024-07-01 ENCOUNTER — PATIENT OUTREACH (OUTPATIENT)
Dept: ADMINISTRATIVE | Facility: HOSPITAL | Age: 59
End: 2024-07-01

## 2024-07-01 ENCOUNTER — TELEPHONE (OUTPATIENT)
Dept: FAMILY MEDICINE | Facility: CLINIC | Age: 59
End: 2024-07-01

## 2024-07-01 DIAGNOSIS — R92.8 ABNORMAL MAMMOGRAPHY: Primary | ICD-10-CM

## 2024-07-01 NOTE — PROGRESS NOTES
Mammogram is abnormal.  Please make sure patient is scheduled for diagnostic mammogram with ultrasound.

## 2024-07-01 NOTE — PROGRESS NOTES
Population Health Chart Review & Patient Outreach Details      Additional [facility] Health Notes:               Updates Requested / Reviewed:      Updated Care Coordination Note         Health Maintenance Topics Overdue:      VB Score: 1     Hemoglobin A1c                       Health Maintenance Topic(s) Outreach Outcomes & Actions Taken:    Breast Cancer Screening - Outreach Outcomes & Actions Taken  : External Records Uploaded & Care Team Updated if Applicable and Message sent to provider for additional testing.

## 2024-07-16 LAB — BCS RECOMMENDATION EXT: NORMAL

## 2024-07-25 ENCOUNTER — OCCUPATIONAL HEALTH (OUTPATIENT)
Dept: URGENT CARE | Facility: CLINIC | Age: 59
End: 2024-07-25

## 2024-07-30 DIAGNOSIS — F41.9 ANXIETY: ICD-10-CM

## 2024-07-31 RX ORDER — ESCITALOPRAM OXALATE 10 MG/1
10 TABLET ORAL
Qty: 90 TABLET | Refills: 1 | Status: SHIPPED | OUTPATIENT
Start: 2024-07-31

## 2024-08-01 ENCOUNTER — PATIENT OUTREACH (OUTPATIENT)
Dept: ADMINISTRATIVE | Facility: HOSPITAL | Age: 59
End: 2024-08-01

## 2024-08-01 ENCOUNTER — TELEPHONE (OUTPATIENT)
Dept: FAMILY MEDICINE | Facility: CLINIC | Age: 59
End: 2024-08-01

## 2024-08-01 NOTE — PROGRESS NOTES
Population Health Chart Review & Patient Outreach Details      Additional [facility] Notes:               Updates Requested / Reviewed:      Updated Care Coordination Note, Care Everywhere, , and External Sources: [facility]         Health Maintenance Topics Overdue:      VBHM Score: 1     Hemoglobin A1c                       Health Maintenance Topic(s) Outreach Outcomes & Actions Taken:    Breast Cancer Screening - Outreach Outcomes & Actions Taken  : External Records Uploaded & Care Team Updated if Applicable    Lab(s) - Outreach Outcomes & Actions Taken  : External Records Uploaded & Care Team Updated if Applicable and HEP C AND HIV HYPERLINKED

## 2025-07-21 ENCOUNTER — OCCUPATIONAL HEALTH (OUTPATIENT)
Dept: URGENT CARE | Facility: CLINIC | Age: 60
End: 2025-07-21

## 2025-07-21 DIAGNOSIS — Z00.00 ROUTINE GENERAL MEDICAL EXAMINATION AT A HEALTH CARE FACILITY: Primary | ICD-10-CM

## 2025-07-21 PROCEDURE — 99499 UNLISTED E&M SERVICE: CPT | Mod: S$GLB,,,

## 2025-09-02 ENCOUNTER — TELEPHONE (OUTPATIENT)
Dept: FAMILY MEDICINE | Facility: CLINIC | Age: 60
End: 2025-09-02
Payer: COMMERCIAL

## 2025-09-03 ENCOUNTER — OFFICE VISIT (OUTPATIENT)
Dept: FAMILY MEDICINE | Facility: CLINIC | Age: 60
End: 2025-09-03
Payer: COMMERCIAL

## 2025-09-03 VITALS
SYSTOLIC BLOOD PRESSURE: 108 MMHG | OXYGEN SATURATION: 99 % | HEART RATE: 72 BPM | WEIGHT: 145.81 LBS | DIASTOLIC BLOOD PRESSURE: 64 MMHG | HEIGHT: 62 IN | BODY MASS INDEX: 26.83 KG/M2 | TEMPERATURE: 98 F

## 2025-09-03 DIAGNOSIS — C54.1 ENDOMETRIAL SARCOMA: ICD-10-CM

## 2025-09-03 DIAGNOSIS — L03.115 CELLULITIS OF RIGHT LOWER LEG: ICD-10-CM

## 2025-09-03 DIAGNOSIS — R50.9 FEVER, UNSPECIFIED FEVER CAUSE: ICD-10-CM

## 2025-09-03 DIAGNOSIS — R32 URINARY INCONTINENCE, UNSPECIFIED TYPE: ICD-10-CM

## 2025-09-03 DIAGNOSIS — Z00.00 PHYSICAL EXAM: Primary | ICD-10-CM

## 2025-09-03 PROCEDURE — 99214 OFFICE O/P EST MOD 30 MIN: CPT | Mod: S$GLB,,, | Performed by: FAMILY MEDICINE

## 2025-09-03 PROCEDURE — 3044F HG A1C LEVEL LT 7.0%: CPT | Mod: CPTII,S$GLB,, | Performed by: FAMILY MEDICINE

## 2025-09-03 PROCEDURE — 99999 PR PBB SHADOW E&M-EST. PATIENT-LVL IV: CPT | Mod: PBBFAC,,, | Performed by: FAMILY MEDICINE

## 2025-09-03 PROCEDURE — 1159F MED LIST DOCD IN RCRD: CPT | Mod: CPTII,S$GLB,, | Performed by: FAMILY MEDICINE

## 2025-09-03 PROCEDURE — 3008F BODY MASS INDEX DOCD: CPT | Mod: CPTII,S$GLB,, | Performed by: FAMILY MEDICINE

## 2025-09-03 PROCEDURE — 1160F RVW MEDS BY RX/DR IN RCRD: CPT | Mod: CPTII,S$GLB,, | Performed by: FAMILY MEDICINE

## 2025-09-03 PROCEDURE — 3078F DIAST BP <80 MM HG: CPT | Mod: CPTII,S$GLB,, | Performed by: FAMILY MEDICINE

## 2025-09-03 PROCEDURE — 3074F SYST BP LT 130 MM HG: CPT | Mod: CPTII,S$GLB,, | Performed by: FAMILY MEDICINE

## 2025-09-03 RX ORDER — DOXYCYCLINE 100 MG/1
100 CAPSULE ORAL 2 TIMES DAILY
Qty: 20 CAPSULE | Refills: 0 | Status: SHIPPED | OUTPATIENT
Start: 2025-09-03

## (undated) DEVICE — TUBING SUCTION 12' ST2003

## (undated) DEVICE — CATHETER URETHRAL RED 16FR

## (undated) DEVICE — SOLUTION SCRUB IODINE 4OZ

## (undated) DEVICE — PENCIL BOVIE E2515H

## (undated) DEVICE — TOWEL OR BLUE      MDT2168284

## (undated) DEVICE — TRAY MINOR SLIDELL MEMORIAL HOSPITAL

## (undated) DEVICE — GLOVE BIOGEL PI ULTRA TOUCH GRAY SZ 8.5

## (undated) DEVICE — SOLUTION NACL 0.9% 3000ML

## (undated) DEVICE — DRESSING TELFA 3X8  1238

## (undated) DEVICE — DRAPE UNDER BUTTOCKS W/SUCTION PORT

## (undated) DEVICE — SYSTEM FLUENT FLUID MANAGEMENT

## (undated) DEVICE — SOLUTION IRRI NS BOTTLE 1000ML R5200-01

## (undated) DEVICE — TUBING CYSTO SINGLE V4608-20

## (undated) DEVICE — PAD BOVIE ADULT

## (undated) DEVICE — PACK LITHOTOMY 88521

## (undated) DEVICE — SOCK COLLECTION FLUENT SYSTEM

## (undated) DEVICE — GOWN SURG. AERO CHROME XXL

## (undated) DEVICE — PAD MATERNITY

## (undated) DEVICE — GOWN SMART LRG 044673

## (undated) DEVICE — SOLUTION PREP IODINE 4OZ

## (undated) DEVICE — SPONGE XRAY DETECTABLE 4X4

## (undated) DEVICE — SEAL SCOPE LENS MYSOSURE  40-902

## (undated) DEVICE — DEVICE NOVASURE ADVANCE W/SURESOUND

## (undated) DEVICE — TRAY SKIN PREP DRY

## (undated) DEVICE — REMOVAL TISSUE MYOSURE REACH

## (undated) DEVICE — JELLY LUBRICATING TUBE 4OZ 4OZLUB

## (undated) DEVICE — COVER LIGHT HANDLE LB53